# Patient Record
Sex: MALE | Race: WHITE | Employment: OTHER | URBAN - METROPOLITAN AREA
[De-identification: names, ages, dates, MRNs, and addresses within clinical notes are randomized per-mention and may not be internally consistent; named-entity substitution may affect disease eponyms.]

---

## 2017-02-09 ENCOUNTER — HOSPITAL ENCOUNTER (INPATIENT)
Age: 57
LOS: 1 days | Discharge: HOME | DRG: 871 | End: 2017-02-10
Attending: EMERGENCY MEDICINE | Admitting: INTERNAL MEDICINE
Payer: COMMERCIAL

## 2017-02-09 ENCOUNTER — APPOINTMENT (OUTPATIENT)
Dept: GENERAL RADIOLOGY | Age: 57
DRG: 871 | End: 2017-02-09
Attending: EMERGENCY MEDICINE
Payer: COMMERCIAL

## 2017-02-09 DIAGNOSIS — J11.1 FLU: Primary | ICD-10-CM

## 2017-02-09 PROBLEM — J44.1 COPD EXACERBATION (HCC): Status: ACTIVE | Noted: 2017-02-09

## 2017-02-09 PROBLEM — A41.9 SEPSIS (HCC): Status: ACTIVE | Noted: 2017-02-09

## 2017-02-09 PROBLEM — J96.01 ACUTE RESPIRATORY FAILURE WITH HYPOXIA (HCC): Status: ACTIVE | Noted: 2017-02-09

## 2017-02-09 LAB
ALBUMIN SERPL BCP-MCNC: 4.2 G/DL (ref 3.5–5)
ALBUMIN/GLOB SERPL: 0.8 {RATIO} (ref 1.2–3.5)
ALP SERPL-CCNC: 113 U/L (ref 50–136)
ALT SERPL-CCNC: 30 U/L (ref 12–65)
ANION GAP BLD CALC-SCNC: 15 MMOL/L (ref 7–16)
AST SERPL W P-5'-P-CCNC: 30 U/L (ref 15–37)
ATRIAL RATE: 134 BPM
BASOPHILS # BLD AUTO: 0 K/UL (ref 0–0.2)
BASOPHILS # BLD: 0 % (ref 0–2)
BILIRUB SERPL-MCNC: 0.5 MG/DL (ref 0.2–1.1)
BUN SERPL-MCNC: 10 MG/DL (ref 6–23)
CALCIUM SERPL-MCNC: 9.8 MG/DL (ref 8.3–10.4)
CALCULATED P AXIS, ECG09: 83 DEGREES
CALCULATED R AXIS, ECG10: 96 DEGREES
CALCULATED T AXIS, ECG11: 81 DEGREES
CHLORIDE SERPL-SCNC: 93 MMOL/L (ref 98–107)
CO2 SERPL-SCNC: 23 MMOL/L (ref 21–32)
CREAT SERPL-MCNC: 1.31 MG/DL (ref 0.8–1.5)
DIAGNOSIS, 93000: NORMAL
DIASTOLIC BP, ECG02: NORMAL MMHG
DIFFERENTIAL METHOD BLD: ABNORMAL
EOSINOPHIL # BLD: 0 K/UL (ref 0–0.8)
EOSINOPHIL NFR BLD: 0 % (ref 0.5–7.8)
ERYTHROCYTE [DISTWIDTH] IN BLOOD BY AUTOMATED COUNT: 13.5 % (ref 11.9–14.6)
FLUAV AG NPH QL IA: POSITIVE
FLUBV AG NPH QL IA: NEGATIVE
GLOBULIN SER CALC-MCNC: 5.3 G/DL (ref 2.3–3.5)
GLUCOSE SERPL-MCNC: 121 MG/DL (ref 65–100)
HCT VFR BLD AUTO: 53.9 % (ref 41.1–50.3)
HGB BLD-MCNC: 18.7 G/DL (ref 13.6–17.2)
IMM GRANULOCYTES # BLD: 0 K/UL (ref 0–0.5)
IMM GRANULOCYTES NFR BLD AUTO: 0.2 % (ref 0–5)
LACTATE BLD-SCNC: 2.3 MMOL/L (ref 0.5–1.9)
LYMPHOCYTES # BLD AUTO: 9 % (ref 13–44)
LYMPHOCYTES # BLD: 1.2 K/UL (ref 0.5–4.6)
MCH RBC QN AUTO: 36.3 PG (ref 26.1–32.9)
MCHC RBC AUTO-ENTMCNC: 34.7 G/DL (ref 31.4–35)
MCV RBC AUTO: 104.7 FL (ref 79.6–97.8)
MONOCYTES # BLD: 0.8 K/UL (ref 0.1–1.3)
MONOCYTES NFR BLD AUTO: 6 % (ref 4–12)
NEUTS SEG # BLD: 11 K/UL (ref 1.7–8.2)
NEUTS SEG NFR BLD AUTO: 85 % (ref 43–78)
P-R INTERVAL, ECG05: 126 MS
PLATELET # BLD AUTO: 280 K/UL (ref 150–450)
PMV BLD AUTO: 9.3 FL (ref 10.8–14.1)
POTASSIUM SERPL-SCNC: 4.1 MMOL/L (ref 3.5–5.1)
PROCALCITONIN SERPL-MCNC: 0.5 NG/ML
PROT SERPL-MCNC: 9.5 G/DL (ref 6.3–8.2)
Q-T INTERVAL, ECG07: 276 MS
QRS DURATION, ECG06: 74 MS
QTC CALCULATION (BEZET), ECG08: 412 MS
RBC # BLD AUTO: 5.15 M/UL (ref 4.23–5.67)
SODIUM SERPL-SCNC: 131 MMOL/L (ref 136–145)
SYSTOLIC BP, ECG01: NORMAL MMHG
VENTRICULAR RATE, ECG03: 134 BPM
WBC # BLD AUTO: 13 K/UL (ref 4.3–11.1)

## 2017-02-09 PROCEDURE — 74011250636 HC RX REV CODE- 250/636: Performed by: EMERGENCY MEDICINE

## 2017-02-09 PROCEDURE — 74011250636 HC RX REV CODE- 250/636: Performed by: INTERNAL MEDICINE

## 2017-02-09 PROCEDURE — 96361 HYDRATE IV INFUSION ADD-ON: CPT | Performed by: EMERGENCY MEDICINE

## 2017-02-09 PROCEDURE — 83605 ASSAY OF LACTIC ACID: CPT

## 2017-02-09 PROCEDURE — 87040 BLOOD CULTURE FOR BACTERIA: CPT | Performed by: EMERGENCY MEDICINE

## 2017-02-09 PROCEDURE — 74011250637 HC RX REV CODE- 250/637: Performed by: FAMILY MEDICINE

## 2017-02-09 PROCEDURE — 74011000250 HC RX REV CODE- 250: Performed by: INTERNAL MEDICINE

## 2017-02-09 PROCEDURE — 65270000029 HC RM PRIVATE

## 2017-02-09 PROCEDURE — 99285 EMERGENCY DEPT VISIT HI MDM: CPT | Performed by: EMERGENCY MEDICINE

## 2017-02-09 PROCEDURE — 87804 INFLUENZA ASSAY W/OPTIC: CPT | Performed by: EMERGENCY MEDICINE

## 2017-02-09 PROCEDURE — 74011250637 HC RX REV CODE- 250/637: Performed by: INTERNAL MEDICINE

## 2017-02-09 PROCEDURE — 80053 COMPREHEN METABOLIC PANEL: CPT | Performed by: EMERGENCY MEDICINE

## 2017-02-09 PROCEDURE — 94640 AIRWAY INHALATION TREATMENT: CPT

## 2017-02-09 PROCEDURE — 71020 XR CHEST PA LAT: CPT

## 2017-02-09 PROCEDURE — 85025 COMPLETE CBC W/AUTO DIFF WBC: CPT | Performed by: EMERGENCY MEDICINE

## 2017-02-09 PROCEDURE — 93005 ELECTROCARDIOGRAM TRACING: CPT | Performed by: EMERGENCY MEDICINE

## 2017-02-09 PROCEDURE — 96360 HYDRATION IV INFUSION INIT: CPT | Performed by: EMERGENCY MEDICINE

## 2017-02-09 PROCEDURE — 84145 PROCALCITONIN (PCT): CPT | Performed by: EMERGENCY MEDICINE

## 2017-02-09 PROCEDURE — 74011250637 HC RX REV CODE- 250/637: Performed by: EMERGENCY MEDICINE

## 2017-02-09 RX ORDER — BENZONATATE 100 MG/1
200 CAPSULE ORAL
Status: COMPLETED | OUTPATIENT
Start: 2017-02-09 | End: 2017-02-09

## 2017-02-09 RX ORDER — OSELTAMIVIR PHOSPHATE 75 MG/1
75 CAPSULE ORAL ONCE
Status: COMPLETED | OUTPATIENT
Start: 2017-02-09 | End: 2017-02-09

## 2017-02-09 RX ORDER — IPRATROPIUM BROMIDE AND ALBUTEROL SULFATE 2.5; .5 MG/3ML; MG/3ML
3 SOLUTION RESPIRATORY (INHALATION)
Status: DISCONTINUED | OUTPATIENT
Start: 2017-02-09 | End: 2017-02-10 | Stop reason: HOSPADM

## 2017-02-09 RX ORDER — SODIUM CHLORIDE 9 MG/ML
125 INJECTION, SOLUTION INTRAVENOUS CONTINUOUS
Status: DISCONTINUED | OUTPATIENT
Start: 2017-02-09 | End: 2017-02-10 | Stop reason: HOSPADM

## 2017-02-09 RX ORDER — OXYCODONE AND ACETAMINOPHEN 5; 325 MG/1; MG/1
1 TABLET ORAL EVERY 6 HOURS
Status: DISCONTINUED | OUTPATIENT
Start: 2017-02-09 | End: 2017-02-09

## 2017-02-09 RX ORDER — OXYCODONE AND ACETAMINOPHEN 5; 325 MG/1; MG/1
1 TABLET ORAL
Status: DISCONTINUED | OUTPATIENT
Start: 2017-02-09 | End: 2017-02-10 | Stop reason: HOSPADM

## 2017-02-09 RX ORDER — SODIUM CHLORIDE 0.9 % (FLUSH) 0.9 %
5-10 SYRINGE (ML) INJECTION AS NEEDED
Status: DISCONTINUED | OUTPATIENT
Start: 2017-02-09 | End: 2017-02-10 | Stop reason: HOSPADM

## 2017-02-09 RX ORDER — OSELTAMIVIR PHOSPHATE 30 MG/1
30 CAPSULE ORAL 2 TIMES DAILY
Status: DISCONTINUED | OUTPATIENT
Start: 2017-02-09 | End: 2017-02-10 | Stop reason: HOSPADM

## 2017-02-09 RX ORDER — ACETAMINOPHEN 500 MG
1000 TABLET ORAL
Status: COMPLETED | OUTPATIENT
Start: 2017-02-09 | End: 2017-02-09

## 2017-02-09 RX ORDER — IPRATROPIUM BROMIDE AND ALBUTEROL SULFATE 2.5; .5 MG/3ML; MG/3ML
3 SOLUTION RESPIRATORY (INHALATION)
Status: DISCONTINUED | OUTPATIENT
Start: 2017-02-09 | End: 2017-02-09

## 2017-02-09 RX ORDER — ACETAMINOPHEN 325 MG/1
650 TABLET ORAL
Status: DISCONTINUED | OUTPATIENT
Start: 2017-02-09 | End: 2017-02-10 | Stop reason: HOSPADM

## 2017-02-09 RX ADMIN — ACETAMINOPHEN 1000 MG: 500 TABLET, FILM COATED ORAL at 10:25

## 2017-02-09 RX ADMIN — BENZONATATE 200 MG: 100 CAPSULE ORAL at 10:24

## 2017-02-09 RX ADMIN — METHYLPREDNISOLONE SODIUM SUCCINATE 20 MG: 40 INJECTION, POWDER, FOR SOLUTION INTRAMUSCULAR; INTRAVENOUS at 15:27

## 2017-02-09 RX ADMIN — SODIUM CHLORIDE 125 ML/HR: 900 INJECTION, SOLUTION INTRAVENOUS at 20:37

## 2017-02-09 RX ADMIN — SODIUM CHLORIDE 125 ML/HR: 900 INJECTION, SOLUTION INTRAVENOUS at 15:27

## 2017-02-09 RX ADMIN — OSELTAMIVIR PHOSPHATE 30 MG: 30 CAPSULE ORAL at 20:37

## 2017-02-09 RX ADMIN — IPRATROPIUM BROMIDE AND ALBUTEROL SULFATE 3 ML: 2.5; .5 SOLUTION RESPIRATORY (INHALATION) at 21:17

## 2017-02-09 RX ADMIN — OSELTAMIVIR PHOSPHATE 75 MG: 75 CAPSULE ORAL at 10:24

## 2017-02-09 RX ADMIN — SODIUM CHLORIDE 1905 ML: 900 INJECTION, SOLUTION INTRAVENOUS at 10:22

## 2017-02-09 RX ADMIN — ACETAMINOPHEN 650 MG: 325 TABLET, FILM COATED ORAL at 22:23

## 2017-02-09 NOTE — ED PROVIDER NOTES
HPI Comments: 80-year-old gentleman with a history of a fever, chills, cough, shortness of breath, and not feeling well for approximately 2 days. Patient went to his primary care doctor's office who then sent him to the emergency department due to his tachycardia and his respiratory difficulty. Patient says he smokes at least a pack of cigarettes a day. He says about 2 days ago he started to not feel well in the last 36 hours ago at significantly worse. He says he has been coughing extensively but has not coughed anything up. Elements of this note were created with speech recognition software. As such, there may be errors of speech recognition present. Patient is a 64 y.o. male presenting with shortness of breath. The history is provided by the patient. Shortness of Breath   Associated symptoms include a fever and cough. Pertinent negatives include no headaches, no rhinorrhea, no sore throat, no wheezing, no chest pain, no vomiting, no abdominal pain and no rash. Past Medical History:   Diagnosis Date    COPD (chronic obstructive pulmonary disease) ([facility] [address]     Hepatitis C     Hyperlipidemia     Hypertension     Pulmonary nodule     Tobacco abuse 6/24/2015       History reviewed. No pertinent past surgical history.       Family History:   Problem Relation Age of Onset    COPD Mother      Stage 3    Cancer Father      Prostate    Diabetes Father     Heart Disease Father     Cancer Sister      Breast    Other Sister      Atrial Fibrillation    Other Brother      Coronary artery bypass surgery    Alzheimer Maternal Grandmother     Dementia Maternal Grandmother     Other Maternal Grandfather      Old age   Agustin Padron Allergy-severe Paternal Grandmother     Cancer Paternal Grandfather      Prostate    Diabetes Paternal Grandfather        Social History     Social History    Marital status: LEGALLY      Spouse name: N/A    Number of children: N/A    Years of education: N/A Occupational History    Not on file. Social History Main Topics    Smoking status: Current Every Day Smoker     Packs/day: 0.25     Years: 35.00     Types: Cigarettes     Start date: 4/9/1983    Smokeless tobacco: Not on file    Alcohol use 21.0 - 42.0 oz/week     42 - 84 Standard drinks or equivalent per week    Drug use: No    Sexual activity: Not on file     Other Topics Concern    Not on file     Social History Narrative         ALLERGIES: Bees [hymenoptera allergenic extract]    Review of Systems   Constitutional: Positive for chills and fever. Negative for diaphoresis. HENT: Negative for congestion, rhinorrhea and sore throat. Eyes: Negative for redness and visual disturbance. Respiratory: Positive for cough and shortness of breath. Negative for chest tightness and wheezing. Cardiovascular: Negative for chest pain and palpitations. Gastrointestinal: Negative for abdominal pain, blood in stool, diarrhea, nausea and vomiting. Endocrine: Negative for polydipsia and polyuria. Genitourinary: Negative for dysuria and hematuria. Musculoskeletal: Negative for arthralgias, myalgias and neck stiffness. Skin: Negative for rash. Allergic/Immunologic: Negative for environmental allergies and food allergies. Neurological: Negative for dizziness, weakness and headaches. Hematological: Negative for adenopathy. Does not bruise/bleed easily. Psychiatric/Behavioral: Negative for confusion and sleep disturbance. The patient is not nervous/anxious. Vitals:    02/09/17 0944   BP: (!) 157/109   Pulse: (!) 140   Resp: 22   Temp: (!) 101 °F (38.3 °C)   SpO2: (!) 88%   Weight: 63.5 kg (140 lb)   Height: 6' 1\" (1.854 m)            Physical Exam   Constitutional: He is oriented to person, place, and time. He appears well-developed. Patient looks significantly older than his stated age   HENT:   Head: Normocephalic and atraumatic.    Eyes: Conjunctivae and EOM are normal. Pupils are equal, round, and reactive to light. Neck: Normal range of motion. Cardiovascular: Regular rhythm. tachycardia   Pulmonary/Chest: Breath sounds normal. He is in respiratory distress. He has no wheezes. He has no rales. He exhibits no tenderness. Moderate respiratory difficulty with tachypnea and diffuse bilateral wheezes   Abdominal: Soft. Bowel sounds are normal. There is no rebound and no guarding. Musculoskeletal: Normal range of motion. He exhibits no edema or tenderness. Lymphadenopathy:     He has no cervical adenopathy. Neurological: He is alert and oriented to person, place, and time. Skin: Skin is warm and dry. Psychiatric: He has a normal mood and affect. Nursing note and vitals reviewed. MDM  Number of Diagnoses or Management Options  Diagnosis management comments: Patient's flu test is positive tenderness suspect that is the source of his fever, tachycardia, and hypoxia. Even that he meets sepsis criteria do not think that he needs antibiotics at this time as his x-ray showed no evidence of pneumonia. Given his hypoxia and his other symptoms I will plan to admit him to the hospital for further care. I will go ahead and start Tamiflu.     ED Course       Procedures

## 2017-02-09 NOTE — H&P
HOSPITALIST HISTORY AND PHYSICAL  NAME:  Vickie Quinonez   Age:  64 y.o.  :   1960   MRN:   [id number]  PCP: Juan Brewer MD  Consulting MD:  Treatment Team: Attending Provider: Bree Ruiz MD; Primary Nurse: Ramsey Blevins RN    REASON FOR ADMISSION: sepsis secondary to influenza a. Copd exacerbation, hypoxic respiratory failure    HPI:   64yr old pleasant male with a PMHX sig for HTN and mild copd. States that he developed an sinus infection that he has been fighting for about 2 weeks. Started to feel worse on Monday, weak, generalized aches and pains- cough - sob. Went to PCP but still felt worse so came here. In the ER found to be septic secondary to influenza A, Copd exacerbation- hypoxic resp failure- hospitalist asked to admit. Complete ROS done and is as stated in HPI or otherwise negative  Past Medical History   Diagnosis Date    [facility]chronic obstructive pulmonary disease) ([facility] [address]     Hepatitis C     Hyperlipidemia     Hypertension     Pulmonary nodule     Tobacco abuse 2015      History reviewed. No pertinent past surgical history. Prior to Admission Medications   Prescriptions Last Dose Informant Patient Reported? Taking? Hydrochlorothiazide (HYDRODIURIL) 12.5 mg tablet   No No   Sig: Take 1 Tab by mouth daily. albuterol (PROVENTIL HFA, VENTOLIN HFA, PROAIR HFA) 90 mcg/actuation inhaler   No No   Sig: Take 1 Puff by inhalation every six (6) hours as needed for Wheezing. gabapentin (NEURONTIN) 300 mg capsule   No No   Sig: Take 1 Cap by mouth nightly. Take 2 hrs before bedtime. tiotropium bromide (SPIRIVA RESPIMAT) 2.5 mcg/actuation inhaler   No No   Sig: Take 2 Puffs by inhalation daily.       Facility-Administered Medications: None     Allergies   Allergen Reactions    Bees [Hymenoptera Allergenic Extract] Swelling      Social History   Substance Use Topics    Smoking status: Current Every Day Smoker     Packs/day: 0.25     Years: 35.00     Types: Cigarettes Start date: 1983    Smokeless tobacco: Not on file    Alcohol use 21.0 - 42.0 oz/week     42 - 84 Standard drinks or equivalent per week      Family History   Problem Relation Age of Onset    COPD Mother      Stage 3    Cancer Father      Prostate    Diabetes Father     Heart Disease Father     Cancer Sister      Breast    Other Sister      Atrial Fibrillation    Other Brother      Coronary artery bypass surgery    Alzheimer Maternal Grandmother     Dementia Maternal Grandmother     Other Maternal Grandfather      Old age   Vertie Amis Allergy-severe Paternal Grandmother     Cancer Paternal Grandfather      Prostate    Diabetes Paternal Grandfather       Objective:     Visit Vitals    /79    Pulse (!) 106    Temp (!) 101 °F (38.3 °C)    Resp 22    Ht 6' 1\" (1.854 m)    Wt 63.5 kg (140 lb)    SpO2 95%    BMI 18.47 kg/m2      Temp (24hrs), Av.2 °F (38.4 °C), Min:101 °F (38.3 °C), Max:101.4 °F (38.6 °C)    Oxygen Therapy  O2 Sat (%): 95 % (17 1207)  Pulse via Oximetry: 105 beats per minute (17 1207)  O2 Device: Nasal cannula (1745)  O2 Flow Rate (L/min): 2 l/min (17 0945)  Physical Exam:  General:    Alert, cooperative, no distress, appears stated age. Head:   Normocephalic, without obvious abnormality, atraumatic. Nose:  Nares normal. No drainage or sinus tenderness. Lungs:   Clear to auscultation bilaterally. No Wheezing or Rhonchi. No rales. Heart:   Regular rate and rhythm,  no murmur, rub or gallop. Abdomen:   Soft, non-tender. Not distended. Bowel sounds normal.   Extremities: No cyanosis. No edema. No clubbing  Skin:     Texture, turgor normal. No rashes or lesions.   Not Jaundiced  Neurologic: Alert and oriented x 3, no focal deficits   Data Review: personally by me   Recent Results (from the past 24 hour(s))   METABOLIC PANEL, COMPREHENSIVE    Collection Time: 17  9:47 AM   Result Value Ref Range    Sodium 131 (L) 136 - 145 mmol/L Potassium 4.1 3.5 - 5.1 mmol/L    Chloride 93 (L) 98 - 107 mmol/L    CO2 23 21 - 32 mmol/L    Anion gap 15 7 - 16 mmol/L    Glucose 121 (H) 65 - 100 mg/dL    BUN 10 6 - 23 MG/DL    Creatinine 1.31 0.8 - 1.5 MG/DL    GFR est AA >60 >60 ml/min/1.73m2    GFR est non-AA >60 >60 ml/min/1.73m2    Calcium 9.8 8.3 - 10.4 MG/DL    Bilirubin, total 0.5 0.2 - 1.1 MG/DL    ALT (SGPT) 30 12 - 65 U/L    AST (SGOT) 30 15 - 37 U/L    Alk. phosphatase 113 50 - 136 U/L    Protein, total 9.5 (H) 6.3 - 8.2 g/dL    Albumin 4.2 3.5 - 5.0 g/dL    Globulin 5.3 (H) 2.3 - 3.5 g/dL    A-G Ratio 0.8 (L) 1.2 - 3.5     CBC WITH AUTOMATED DIFF    Collection Time: 02/09/17  9:47 AM   Result Value Ref Range    WBC 13.0 (H) 4.3 - 11.1 K/uL    RBC 5.15 4.23 - 5.67 M/uL    HGB 18.7 (H) 13.6 - 17.2 g/dL    HCT 53.9 (H) 41.1 - 50.3 %    .7 (H) 79.6 - 97.8 FL    MCH 36.3 (H) 26.1 - 32.9 PG    MCHC 34.7 31.4 - 35.0 g/dL    RDW 13.5 11.9 - 14.6 %    PLATELET 261 013 - 698 K/uL    MPV 9.3 (L) 10.8 - 14.1 FL    DF AUTOMATED      NEUTROPHILS 85 (H) 43 - 78 %    LYMPHOCYTES 9 (L) 13 - 44 %    MONOCYTES 6 4.0 - 12.0 %    EOSINOPHILS 0 (L) 0.5 - 7.8 %    BASOPHILS 0 0.0 - 2.0 %    IMMATURE GRANULOCYTES 0.2 0.0 - 5.0 %    ABS. NEUTROPHILS 11.0 (H) 1.7 - 8.2 K/UL    ABS. LYMPHOCYTES 1.2 0.5 - 4.6 K/UL    ABS. MONOCYTES 0.8 0.1 - 1.3 K/UL    ABS. EOSINOPHILS 0.0 0.0 - 0.8 K/UL    ABS. BASOPHILS 0.0 0.0 - 0.2 K/UL    ABS. IMM.  GRANS. 0.0 0.0 - 0.5 K/UL   INFLUENZA A & B AG (RAPID TEST)    Collection Time: 02/09/17  9:55 AM   Result Value Ref Range    Influenza A Ag POSITIVE (A) NEG      Influenza B Ag NEGATIVE  NEG     POC LACTIC ACID    Collection Time: 02/09/17 10:09 AM   Result Value Ref Range    Lactic Acid (POC) 2.3 (H) 0.5 - 1.9 mmol/L   EKG, 12 LEAD, INITIAL    Collection Time: 02/09/17 10:15 AM   Result Value Ref Range    Systolic BP  mmHg    Diastolic BP  mmHg    Ventricular Rate 134 BPM    Atrial Rate 134 BPM    P-R Interval 126 ms    QRS Duration 74 ms    Q-T Interval 276 ms    QTC Calculation (Bezet) 412 ms    Calculated P Axis 83 degrees    Calculated R Axis 96 degrees    Calculated T Axis 81 degrees    Diagnosis       !! AGE AND GENDER SPECIFIC ECG ANALYSIS !! Sinus tachycardia  Biatrial enlargement  Rightward axis  Pulmonary disease pattern  Abnormal ECG  No previous ECGs available       Imaging /Procedures /Studies reviewed personally by me  XR Results (most recent):    Results from Hospital Encounter encounter on 02/09/17   XR CHEST PA LAT   Narrative CHEST X-RAY PA AND LATERAL : 2/9/2017  Indication: Fever, weakness for 4 days      Comparison: 3/16/2015  Findings: The lung fields are remarkable for a stable, subtle ill-defined  infiltrate in the left upper lobe. The heart, mediastinum, soft tissues, and  bony structures are remarkable for scoliosis. Impression IMPRESSION: Thoracic scoliosis, stable scarlike infiltrate within the left upper  lobe. CT Scan    Results from Hospital Encounter encounter on 12/03/15   CT CHEST WO CONT   Narrative CT of the chest without contrast.    CLINICAL INDICATION: Pulmonary nodules, followup examination. PROCEDURE: Serial thin section axial images are obtained from the thoracic inlet  through the upper abdomen without the administration of intravenous contrast.    COMPARISON: Chest CT dated 4/3/2015, PET/CT dated 4/14/2015    FINDINGS: The irregular, spiculated nodular density in the posterior aspect of  the left upper lobe that measures roughly 1.8 cm remains stable. Only low  metabolic uptake was noted on the prior chest CT. No new suspicious pulmonary  nodules evident. There is a stable 2 mm pulmonary nodule in the subpleural left  lower lobe. No confluent, dense airspace opacity noted. There is no pleural  effusion or pneumothorax. No mediastinal or axillary adenopathy. Limited evaluation of the upper admissions the adrenal glands to be normal.    No aggressive osseous lesions identified. Impression IMPRESSION:  Stable 1.8 cm spiculated nodular density in the posterior left upper lobe. Given  the low metabolic uptake on PET CT a low-grade neoplasm is not entirely  excluded. Continued CT imaging surveillance as the nodule remains stable. Recommend repeating the CT chest in six months. VAS/US Results (most recent):    Results from Hospital Encounter encounter on 06/29/15   DUPLEX LOW EXT ARTERIES WITH LILI   Narrative HISTORY: Possible claudication. Right left brachial systolic blood pressures were 122 mm mercury and 123 mm  mercury respectively. The right posterior tibialis and dorsalis pedis pressures  were 143 and 126 mm mercury right ankle-brachial index of 1.2. The left  posterior tibialis and dorsalis pedis pressures were 139 and 133 mm mercury  respectively yielding ankle brachial index of 1.1. Abdominal aorta measures 1.6 cm and demonstrates no significant plaque. Calcified posterior atherosclerotic plaque is seen in the posterior common  femoral arteries bilaterally. Waveform tracings on the right are triphasic in  the distal popliteal artery and biphasic in the posterior and anterior tibialis  arteries. On the left, triphasic waveform tracings are demonstrated into the  popliteal artery and biphasic waveforms are present in the anterior posterior  tibialis arteries. Impression IMPRESSION: Normal ankle brachial indices bilaterally, at rest          Assessment and Plan:      Active Hospital Problems    Diagnosis Date Noted    COPD exacerbation ([facility] [address] 02/09/2017    Sepsis ([facility] [address] 02/09/2017    Influenza 02/09/2017    Acute respiratory failure with hypoxia (H[facility]) 02/09/2017       PLAN  ·  Sepsis secondary to influenza- pt, tachycardic with HR >100- 140 bpm, temp of 101.4, wbc >12, lactic acid elevated, hypoxic admit - give supportive care- monitor for resolution- follow up cultures  · Influenza A- start Tamiflu  · Hypoxic resp failure- sats 88% on RA now on O2 2L via NC in ER will try to wean off as we treat underlying conditions  · Copd exacerbation- duonebs steroids, abx-  · HTN- continue home meds  ·  further mgt as his clinical course dictates    Code Status:   Full   Anticipated discharge:   2-3 days  Signed By: Constance Jaramillo MD     February 9, 2017

## 2017-02-09 NOTE — ROUTINE PROCESS
TRANSFER - OUT REPORT:    Verbal report given to receiving RN on Cecilio Lopez  being transferred to [phone] for routine progression of care       Report consisted of patients Situation, Background, Assessment and   Recommendations(SBAR). Information from the following report(s) ED Summary, MAR, Recent Results and Cardiac Rhythm SinusTach was reviewed with the receiving nurse. Lines:   Peripheral IV 02/09/17 Right Antecubital (Active)   Site Assessment Clean, dry, & intact 2/9/2017 10:19 AM   Phlebitis Assessment 0 2/9/2017 10:19 AM   Infiltration Assessment 0 2/9/2017 10:19 AM   Dressing Status Clean, dry, & intact 2/9/2017 10:19 AM   Hub Color/Line Status Green 2/9/2017 10:19 AM        Opportunity for questions and clarification was provided.       Patient transported with:   O2 @ 2 liters

## 2017-02-09 NOTE — PROGRESS NOTES
Pt is alert and oriented X4. Has no nausea or vomiting at this time. No reports of pain, just an irritation in throat due to cough. HR is tachycardic at this time but regular. Abdomen is soft and non-tender, BS active in 4 Quadrants. Pt is on 2L O2 via N/C. Up to bathroom to void. Instructed pt to call for assistance, due to possible weakness.

## 2017-02-09 NOTE — IP AVS SNAPSHOT
[address] 
[phone] Patient: Sabas Garcia MRN: [id number]:[id number] You are allergic to the following Allergen Reactions Bees (Hymenoptera Allergenic Extract) Swelling Recent Documentation Height Weight BMI Smoking Status 1.854 m 63.5 kg 18.47 kg/m2 Current Every Day Smoker Unresulted Labs Order Current Status CULTURE, BLOOD Preliminary result CULTURE, BLOOD Preliminary result Emergency Contacts Name Discharge Info Relation Home Work Mobile [facility] DISCHARGE CAREGIVER [3] Sister [23]   [phone] Jas Watters     [phone] About your hospitalization You were admitted on:  February 9, 2017 You last received care in the:  [address] You were discharged on:  February 10, 2017 Unit phone number:  [phone] Why you were hospitalized Your primary diagnosis was:  Sepsis (Hcc) Your diagnoses also included:  Copd Exacerbation (Hcc), Influenza, Acute Respiratory Failure With Hypoxia (Hcc) Providers Seen During Your Hospitalizations Provider Role Specialty Primary office phone Jerod Lakhani MD Attending Provider Emergency Medicine [phone] Wero Kohler MD Attending Provider Internal Medicine [phone] Your Primary Care Physician (PCP) Primary Care Physician Office Phone Office Fax Matt Fernández [phone] Follow-up Information Follow up With Details Comments Contact Info Chelsea Cole MD In 1 week OFFICE CLOSED PLEASE CALL ON MONDAY TO SCHEDULE FOLLOW UP APPT. [address] [place] [id number] [phone] Your Appointments Monday February 13, 2017  8:15 AM EST Follow Up with Chelsea Cole MD  
[facility] ([facility]) [address] [place] [address] [phone] Current Discharge Medication List  
  
START taking these medications Dose & Instructions Dispensing Information Comments Morning Noon Evening Bedtime  
 oseltamivir 30 mg capsule Commonly known as:  TAMIFLU Your next dose is: Today, Tomorrow Other:  _________ Dose:  30 mg Take 1 Cap by mouth two (2) times a day for 3 days. Quantity:  6 Cap Refills:  0  
     
   
   
   
  
 predniSONE 10 mg tablet Commonly known as:  Hollace Margarito Your next dose is: Today, Tomorrow Other:  _________ Dose:  10 mg Take 1 Tab by mouth daily (with breakfast) for 5 days. Quantity:  5 Tab Refills:  0 CONTINUE these medications which have NOT CHANGED Dose & Instructions Dispensing Information Comments Morning Noon Evening Bedtime  
 albuterol 90 mcg/actuation inhaler Commonly known as:  PROVENTIL HFA, VENTOLIN HFA, PROAIR HFA Your next dose is: Today, Tomorrow Other:  _________ Dose:  1 Puff Take 1 Puff by inhalation every six (6) hours as needed for Wheezing. Quantity:  1 Inhaler Refills:  3  
     
   
   
   
  
 gabapentin 300 mg capsule Commonly known as:  NEURONTIN Your next dose is: Today, Tomorrow Other:  _________ Dose:  300 mg Take 1 Cap by mouth nightly. Take 2 hrs before bedtime. Quantity:  30 Cap Refills:  5  
     
   
   
   
  
 hydroCHLOROthiazide 12.5 mg tablet Commonly known as:  HYDRODIURIL Your next dose is: Today, Tomorrow Other:  _________ Dose:  12.5 mg Take 1 Tab by mouth daily. Quantity:  30 Tab Refills:  3  
     
   
   
   
  
 tiotropium bromide 2.5 mcg/actuation inhaler Commonly known as:  Paris Schneiders Your next dose is: Today, Tomorrow Other:  _________ Dose:  2 Puff Take 2 Puffs by inhalation daily. Quantity:  1 Inhaler Refills:  0 Where to Get Your Medications Information on where to get these meds will be given to you by the nurse or doctor. ! Ask your nurse or doctor about these medications  
  oseltamivir 30 mg capsule  
 predniSONE 10 mg tablet Discharge Instructions DISCHARGE SUMMARY from Nurse The following personal items are in your possession at time of discharge: 
 
  
Visual Aid: None PATIENT INSTRUCTIONS: 
 
After general anesthesia or intravenous sedation, for 24 hours or while taking prescription Narcotics: · Limit your activities · Do not drive and operate hazardous machinery · Do not make important personal or business decisions · Do  not drink alcoholic beverages · If you have not urinated within 8 hours after discharge, please contact your surgeon on call. Report the following to your surgeon: 
· Excessive pain, swelling, redness or odor of or around the surgical area · Temperature over 100.5 · Nausea and vomiting lasting longer than 4 hours or if unable to take medications · Any signs of decreased circulation or nerve impairment to extremity: change in color, persistent  numbness, tingling, coldness or increase pain · Any questions What to do at Home: 
Recommended activity: Activity as tolerated, per MD 
 
If you experience any of the following symptoms fever>101, pain unrelieved with medication, nausea/vomiting, shortness of breath unrelieved by rest, dizziness/fainting, chest pain. , please follow up with your doctor. *  Please give a list of your current medications to your Primary Care Provider. *  Please update this list whenever your medications are discontinued, doses are 
    changed, or new medications (including over-the-counter products) are added. *  Please carry medication information at all times in case of emergency situations. These are general instructions for a healthy lifestyle: No smoking/ No tobacco products/ Avoid exposure to second hand smoke Surgeon General's Warning:  Quitting smoking now greatly reduces serious risk to your health. Obesity, smoking, and sedentary lifestyle greatly increases your risk for illness A healthy diet, regular physical exercise & weight monitoring are important for maintaining a healthy lifestyle You may be retaining fluid if you have a history of heart failure or if you experience any of the following symptoms:  Weight gain of 3 pounds or more overnight or 5 pounds in a week, increased swelling in our hands or feet or shortness of breath while lying flat in bed. Please call your doctor as soon as you notice any of these symptoms; do not wait until your next office visit. Recognize signs and symptoms of STROKE: 
 
F-face looks uneven A-arms unable to move or move unevenly S-speech slurred or non-existent T-time-call 911 as soon as signs and symptoms begin-DO NOT go Back to bed or wait to see if you get better-TIME IS BRAIN. Warning Signs of HEART ATTACK Call 911 if you have these symptoms: 
? Chest discomfort. Most heart attacks involve discomfort in the center of the chest that lasts more than a few minutes, or that goes away and comes back. It can feel like uncomfortable pressure, squeezing, fullness, or pain. ? Discomfort in other areas of the upper body. Symptoms can include pain or discomfort in one or both arms, the back, neck, jaw, or stomach. ? Shortness of breath with or without chest discomfort. ? Other signs may include breaking out in a cold sweat, nausea, or lightheadedness. Don't wait more than five minutes to call [address]! Fast action can save your life. Calling 911 is almost always the fastest way to get lifesaving treatment.  Emergency Medical Services staff can begin treatment when they arrive  up to an hour sooner than if someone gets to the hospital by car. The discharge information has been reviewed with the patient. The patient verbalized understanding. Discharge medications reviewed with the patient and appropriate educational materials and side effects teaching were provided. Learning About COPD, Asthma, and Air Pollution How does air pollution affect COPD and asthma? When you have COPD or asthma, air pollution may make your symptoms worse. If it does, it means that air pollution is a trigger for you. It is important to know what your triggers are and how to deal with them. If air pollution is a trigger for you, you need to learn about air quality and pay attention to weather forecasts that include how bad the air is expected to be. How can you manage a flare-up caused by air pollution? · Do not panic. Quick treatment at home may help you prevent serious breathing problems. · Take your medicines exactly as your doctor tells you. ¨ Use your quick-relief inhaler as directed by your doctor. If your symptoms do not get better after you use your medicine, have someone take you to the emergency room. Call an ambulance if necessary. ¨ With inhaled medicines, a spacer or a nebulizer may help you get more medicine to your lungs. Ask your doctor or pharmacist how to use them properly. Practice using the spacer in front of a mirror before you have a flare-up. This may help you get the medicine into your lungs quickly. ¨ If your doctor has given you steroid pills, take them as directed. ¨ Talk to your doctor if you have any problems with your medicine. What can you do to prevent flare-ups? · Try not to be outside when air pollution levels are high. Stay at home with your windows closed. · Do not smoke. This is the most important step you can take to prevent more damage to your lungs and prevent problems.  If you already smoke, it is never too late to stop. If you need help quitting, talk to your doctor about stop-smoking programs and medicines. These can increase your chances of quitting for good. · Avoid secondhand smoke; cold, dry air; and high altitudes. · Take your daily medicines as prescribed. · Avoid colds and flu. ¨ Get a pneumococcal vaccine. ¨ Get a flu vaccine each year, as soon as it is available. Ask those you live or work with to do the same, so they will not get the flu and infect you. ¨ Try to stay away from people with colds or the flu. ¨ Wash your hands often. Follow-up care is a key part of your treatment and safety. Be sure to make and go to all appointments, and call your doctor if you are having problems. It's also a good idea to know your test results and keep a list of the medicines you take. Where can you learn more? Go to http://mic-mani.info/. Enter  in the search box to learn more about \"Learning About COPD, Asthma, and Air Pollution. \" Current as of: May 23, 2016 Content Version: 11.1 © 1061-3472 [employer]. Care instructions adapted under license by [employer] (which disclaims liability or warranty for this information). If you have questions about a medical condition or this instruction, always ask your healthcare professional. [address] any warranty or liability for your use of this information. Discharge Orders None Introducing [facility]! Dear Ramon Melchor: Thank you for requesting a [facility] account. Our records indicate that you already have an active [facility] account. You can access your account anytime at [email or web address] Did you know that you can access your hospital and ER discharge instructions at any time in [facility]? You can also review all of your test results from your hospital stay or ER visit. Additional Information If you have questions, please visit the Frequently Asked Questions section of the MyChart website at [email or web address] Remember, MyChart is NOT to be used for urgent needs. For medical emergencies, dial 911. Now available from your iPhone and Android! General Information Please provide this summary of care documentation to your next provider. Patient Signature:  ____________________________________________________________ Date:  ____________________________________________________________  
  
[facility] Provider Signature:  ____________________________________________________________ Date:  ____________________________________________________________

## 2017-02-10 VITALS
SYSTOLIC BLOOD PRESSURE: 140 MMHG | OXYGEN SATURATION: 95 % | WEIGHT: 140 LBS | HEART RATE: 89 BPM | TEMPERATURE: 97.8 F | BODY MASS INDEX: 18.55 KG/M2 | HEIGHT: 73 IN | DIASTOLIC BLOOD PRESSURE: 82 MMHG | RESPIRATION RATE: 16 BRPM

## 2017-02-10 LAB
ALBUMIN SERPL BCP-MCNC: 2.6 G/DL (ref 3.5–5)
ALBUMIN/GLOB SERPL: 0.8 {RATIO} (ref 1.2–3.5)
ALP SERPL-CCNC: 64 U/L (ref 50–136)
ALT SERPL-CCNC: 23 U/L (ref 12–65)
ANION GAP BLD CALC-SCNC: 7 MMOL/L (ref 7–16)
AST SERPL W P-5'-P-CCNC: 25 U/L (ref 15–37)
BASOPHILS # BLD AUTO: 0 K/UL (ref 0–0.2)
BASOPHILS # BLD: 0 % (ref 0–2)
BILIRUB SERPL-MCNC: 0.2 MG/DL (ref 0.2–1.1)
BUN SERPL-MCNC: 7 MG/DL (ref 6–23)
CALCIUM SERPL-MCNC: 7.6 MG/DL (ref 8.3–10.4)
CHLORIDE SERPL-SCNC: 102 MMOL/L (ref 98–107)
CO2 SERPL-SCNC: 24 MMOL/L (ref 21–32)
CREAT SERPL-MCNC: 0.83 MG/DL (ref 0.8–1.5)
DIFFERENTIAL METHOD BLD: ABNORMAL
EOSINOPHIL # BLD: 0 K/UL (ref 0–0.8)
EOSINOPHIL NFR BLD: 0 % (ref 0.5–7.8)
ERYTHROCYTE [DISTWIDTH] IN BLOOD BY AUTOMATED COUNT: 13.8 % (ref 11.9–14.6)
GLOBULIN SER CALC-MCNC: 3.4 G/DL (ref 2.3–3.5)
GLUCOSE SERPL-MCNC: 121 MG/DL (ref 65–100)
HCT VFR BLD AUTO: 40.7 % (ref 41.1–50.3)
HGB BLD-MCNC: 13.9 G/DL (ref 13.6–17.2)
IMM GRANULOCYTES # BLD: 0 K/UL (ref 0–0.5)
IMM GRANULOCYTES NFR BLD AUTO: 0.5 % (ref 0–5)
LYMPHOCYTES # BLD AUTO: 20 % (ref 13–44)
LYMPHOCYTES # BLD: 1.2 K/UL (ref 0.5–4.6)
MCH RBC QN AUTO: 36.3 PG (ref 26.1–32.9)
MCHC RBC AUTO-ENTMCNC: 34.2 G/DL (ref 31.4–35)
MCV RBC AUTO: 106.3 FL (ref 79.6–97.8)
MONOCYTES # BLD: 0.5 K/UL (ref 0.1–1.3)
MONOCYTES NFR BLD AUTO: 8 % (ref 4–12)
NEUTS SEG # BLD: 4.4 K/UL (ref 1.7–8.2)
NEUTS SEG NFR BLD AUTO: 72 % (ref 43–78)
PLATELET # BLD AUTO: 206 K/UL (ref 150–450)
PMV BLD AUTO: 9.4 FL (ref 10.8–14.1)
POTASSIUM SERPL-SCNC: 3.5 MMOL/L (ref 3.5–5.1)
PROT SERPL-MCNC: 6 G/DL (ref 6.3–8.2)
RBC # BLD AUTO: 3.83 M/UL (ref 4.23–5.67)
SODIUM SERPL-SCNC: 133 MMOL/L (ref 136–145)
WBC # BLD AUTO: 6.1 K/UL (ref 4.3–11.1)

## 2017-02-10 PROCEDURE — 77010033678 HC OXYGEN DAILY

## 2017-02-10 PROCEDURE — 94760 N-INVAS EAR/PLS OXIMETRY 1: CPT

## 2017-02-10 PROCEDURE — 85025 COMPLETE CBC W/AUTO DIFF WBC: CPT | Performed by: INTERNAL MEDICINE

## 2017-02-10 PROCEDURE — 80053 COMPREHEN METABOLIC PANEL: CPT | Performed by: INTERNAL MEDICINE

## 2017-02-10 PROCEDURE — 74011250636 HC RX REV CODE- 250/636: Performed by: INTERNAL MEDICINE

## 2017-02-10 PROCEDURE — 74011000250 HC RX REV CODE- 250: Performed by: INTERNAL MEDICINE

## 2017-02-10 PROCEDURE — 94640 AIRWAY INHALATION TREATMENT: CPT

## 2017-02-10 PROCEDURE — 36415 COLL VENOUS BLD VENIPUNCTURE: CPT | Performed by: INTERNAL MEDICINE

## 2017-02-10 PROCEDURE — 74011250637 HC RX REV CODE- 250/637: Performed by: INTERNAL MEDICINE

## 2017-02-10 RX ORDER — OSELTAMIVIR PHOSPHATE 30 MG/1
30 CAPSULE ORAL 2 TIMES DAILY
Qty: 6 CAP | Refills: 0 | Status: SHIPPED | OUTPATIENT
Start: 2017-02-10 | End: 2017-02-13

## 2017-02-10 RX ORDER — PREDNISONE 10 MG/1
10 TABLET ORAL
Qty: 5 TAB | Refills: 0 | Status: SHIPPED | OUTPATIENT
Start: 2017-02-10 | End: 2017-02-15

## 2017-02-10 RX ADMIN — OSELTAMIVIR PHOSPHATE 30 MG: 30 CAPSULE ORAL at 09:11

## 2017-02-10 RX ADMIN — IPRATROPIUM BROMIDE AND ALBUTEROL SULFATE 3 ML: 2.5; .5 SOLUTION RESPIRATORY (INHALATION) at 04:27

## 2017-02-10 RX ADMIN — SODIUM CHLORIDE 125 ML/HR: 900 INJECTION, SOLUTION INTRAVENOUS at 03:22

## 2017-02-10 RX ADMIN — IPRATROPIUM BROMIDE AND ALBUTEROL SULFATE 3 ML: 2.5; .5 SOLUTION RESPIRATORY (INHALATION) at 12:55

## 2017-02-10 RX ADMIN — IPRATROPIUM BROMIDE AND ALBUTEROL SULFATE 3 ML: 2.5; .5 SOLUTION RESPIRATORY (INHALATION) at 08:00

## 2017-02-10 RX ADMIN — METHYLPREDNISOLONE SODIUM SUCCINATE 20 MG: 40 INJECTION, POWDER, FOR SOLUTION INTRAMUSCULAR; INTRAVENOUS at 09:11

## 2017-02-10 NOTE — PROGRESS NOTES
Shift assessment complete via doc flowsheet. Pt A&O x 4. HR regular, S1S2. Respirations even and unlabored. Lung sounds coarse bilaterally. Abdomen soft and non tender. Bowel sounds active in all quadrants. Pt denies pain. Oxygen on 2 L via nasal cannula. Peripheral IV patent with IVF infusing. Pt resting in bed. Family present. Bed in low and locked position. Side rails up x 3. Call light within reach. Pt instructed to call for assistance. Pt verbalizes understanding. No other needs expressed. No distress noted. Will continue to monitor.

## 2017-02-10 NOTE — PROGRESS NOTES
Pt C/O pain 6/10 in head. Tylenol 650 mg PO administered, see MAR. No other needs expressed. No distress noted. Will continue to monitor.

## 2017-02-10 NOTE — PROGRESS NOTES
Pt resting in bed. Family present. No distress noted. Pt denies pain. No other needs expressed. Will continue to monitor.

## 2017-02-10 NOTE — PROGRESS NOTES
Pt is feeling much stronger today. No reports of nausea or vomiting. No fever present or reports of pain. Appetite is good, abdomen soft and BS active in 4 Quadrants, HR regular, lungs are somewhat coarse, breathing unlaboured at this time. Pt is voiding regularly. Family present in room.

## 2017-02-10 NOTE — DISCHARGE INSTRUCTIONS
DISCHARGE SUMMARY from Nurse    The following personal items are in your possession at time of discharge:       Visual Aid: None                            PATIENT INSTRUCTIONS:    After general anesthesia or intravenous sedation, for 24 hours or while taking prescription Narcotics:  · Limit your activities  · Do not drive and operate hazardous machinery  · Do not make important personal or business decisions  · Do  not drink alcoholic beverages  · If you have not urinated within 8 hours after discharge, please contact your surgeon on call. Report the following to your surgeon:  · Excessive pain, swelling, redness or odor of or around the surgical area  · Temperature over 100.5  · Nausea and vomiting lasting longer than 4 hours or if unable to take medications  · Any signs of decreased circulation or nerve impairment to extremity: change in color, persistent  numbness, tingling, coldness or increase pain  · Any questions        What to do at Home:  Recommended activity: Activity as tolerated, per MD    If you experience any of the following symptoms fever>101, pain unrelieved with medication, nausea/vomiting, shortness of breath unrelieved by rest, dizziness/fainting, chest pain. , please follow up with your doctor. *  Please give a list of your current medications to your Primary Care Provider. *  Please update this list whenever your medications are discontinued, doses are      changed, or new medications (including over-the-counter products) are added. *  Please carry medication information at all times in case of emergency situations. These are general instructions for a healthy lifestyle:    No smoking/ No tobacco products/ Avoid exposure to second hand smoke    Surgeon General's Warning:  Quitting smoking now greatly reduces serious risk to your health.     Obesity, smoking, and sedentary lifestyle greatly increases your risk for illness    A healthy diet, regular physical exercise & weight monitoring are important for maintaining a healthy lifestyle    You may be retaining fluid if you have a history of heart failure or if you experience any of the following symptoms:  Weight gain of 3 pounds or more overnight or 5 pounds in a week, increased swelling in our hands or feet or shortness of breath while lying flat in bed. Please call your doctor as soon as you notice any of these symptoms; do not wait until your next office visit. Recognize signs and symptoms of STROKE:    F-face looks uneven    A-arms unable to move or move unevenly    S-speech slurred or non-existent    T-time-call 911 as soon as signs and symptoms begin-DO NOT go       Back to bed or wait to see if you get better-TIME IS BRAIN. Warning Signs of HEART ATTACK     Call 911 if you have these symptoms:   Chest discomfort. Most heart attacks involve discomfort in the center of the chest that lasts more than a few minutes, or that goes away and comes back. It can feel like uncomfortable pressure, squeezing, fullness, or pain.  Discomfort in other areas of the upper body. Symptoms can include pain or discomfort in one or both arms, the back, neck, jaw, or stomach.  Shortness of breath with or without chest discomfort.  Other signs may include breaking out in a cold sweat, nausea, or lightheadedness. Don't wait more than five minutes to call 911 - MINUTES MATTER! Fast action can save your life. Calling 911 is almost always the fastest way to get lifesaving treatment. Emergency Medical Services staff can begin treatment when they arrive -- up to an hour sooner than if someone gets to the hospital by car. The discharge information has been reviewed with the patient. The patient verbalized understanding. Discharge medications reviewed with the patient and appropriate educational materials and side effects teaching were provided.                Learning About COPD, Asthma, and Air Pollution  How does air pollution affect COPD and asthma? When you have COPD or asthma, air pollution may make your symptoms worse. If it does, it means that air pollution is a trigger for you. It is important to know what your triggers are and how to deal with them. If air pollution is a trigger for you, you need to learn about air quality and pay attention to weather forecasts that include how bad the air is expected to be. How can you manage a flare-up caused by air pollution? · Do not panic. Quick treatment at home may help you prevent serious breathing problems. · Take your medicines exactly as your doctor tells you. ¨ Use your quick-relief inhaler as directed by your doctor. If your symptoms do not get better after you use your medicine, have someone take you to the emergency room. Call an ambulance if necessary. ¨ With inhaled medicines, a spacer or a nebulizer may help you get more medicine to your lungs. Ask your doctor or pharmacist how to use them properly. Practice using the spacer in front of a mirror before you have a flare-up. This may help you get the medicine into your lungs quickly. ¨ If your doctor has given you steroid pills, take them as directed. ¨ Talk to your doctor if you have any problems with your medicine. What can you do to prevent flare-ups? · Try not to be outside when air pollution levels are high. Stay at home with your windows closed. · Do not smoke. This is the most important step you can take to prevent more damage to your lungs and prevent problems. If you already smoke, it is never too late to stop. If you need help quitting, talk to your doctor about stop-smoking programs and medicines. These can increase your chances of quitting for good. · Avoid secondhand smoke; cold, dry air; and high altitudes. · Take your daily medicines as prescribed. · Avoid colds and flu. ¨ Get a pneumococcal vaccine. ¨ Get a flu vaccine each year, as soon as it is available.  Ask those you live or work with to do the same, so they will not get the flu and infect you. ¨ Try to stay away from people with colds or the flu. ¨ Wash your hands often. Follow-up care is a key part of your treatment and safety. Be sure to make and go to all appointments, and call your doctor if you are having problems. It's also a good idea to know your test results and keep a list of the medicines you take. Where can you learn more? Go to http://mic-mani.info/. Enter  in the search box to learn more about \"Learning About COPD, Asthma, and Air Pollution. \"  Current as of: May 23, 2016  Content Version: 11.1  © 8130-1829 [employer]. Care instructions adapted under license by [employer] (which disclaims liability or warranty for this information). If you have questions about a medical condition or this instruction, always ask your healthcare professional. [address] any warranty or liability for your use of this information.

## 2017-02-10 NOTE — PROGRESS NOTES
Discharge instructions and prescriptions provided and explained to the pt. Med side effect sheet reviewed. Opportunity for questions provided. Pt is waiting for son to arrive to take him home. Instructed to call once ready to leave.

## 2017-02-10 NOTE — PROGRESS NOTES
Hospitalist Discharge Summary     Patient ID:  Kristofer Mcnamara  [id number]  50 y.o.  1960  Admit date: 2/9/2017  9:41 AM  Discharge date and time: 2/10/2017  Attending: Constance Jaramillo MD  PCP:  Kofi Gee MD  Treatment Team: Attending Provider: Constance Jaramillo MD; Utilization Review: Giuliana Bourgeois RN    Principal Diagnosis Sepsis [facility])   Principal Problem:    Sepsis ([facility] [address] (2/9/2017)    Active Problems:    COPD exacerbation ([facility] [address] (2/9/2017)      Influenza (2/9/2017)      Acute respiratory failure with hypoxia ([facility] (2/9/2017)     HPI: 56yr old pleasant male with a PMHX sig for HTN and mild copd. States that he developed an sinus infection that he has been fighting for about 2 weeks. Started to feel worse on Monday, weak, generalized aches and pains- cough - sob. Went to PCP but still felt worse so came here. In the ER found to be septic secondary to influenza A, Copd exacerbation- hypoxic resp failure- hospitalist asked to admit. Hospital Course:  Please refer to the admission H&P for details of presentation. In summary, the patient presented with dyspnea related to copd and influenza infection. Started on tamiflu and solumedrol on admission. Symptoms are improving. Pt remains afebrile. Now stable on room air. Will complete 5 day course of tamiflu. Given prednisone 10 mg daily for additional 5 days. Pt is currently stable for dc to home, will follow up with pcp.          Labs: Results:       Chemistry Recent Labs      02/10/17   0530  02/09/17   0947   GLU  121*  121*   NA  133*  131*   K  3.5  4.1   CL  102  93*   CO2  24  23   BUN  7  10   CREA  0.83  1.31   CA  7.6*  9.8   AGAP  7  15   AP  64  113   TP  6.0*  9.5*   ALB  2.6*  4.2   GLOB  3.4  5.3*   AGRAT  0.8*  0.8*      CBC w/Diff Recent Labs      02/10/17   0530  02/09/17   0947   WBC  6.1  13.0*   RBC  3.83*  5.15   HGB  13.9  18.7*   HCT  40.7*  53.9*   PLT  206  280   GRANS  72  85*   LYMPH  20  9*   EOS  0*  0* Cardiac Enzymes No results for input(s): CPK, CKND1, LALITHA in the last 72 hours. No lab exists for component: CKRMB, TROIP   Coagulation No results for input(s): PTP, INR, APTT in the last 72 hours. No lab exists for component: INREXT    Lipid Panel Lab Results   Component Value Date/Time    Cholesterol, total 166 06/29/2016 11:30 AM    HDL Cholesterol 111 06/29/2016 11:30 AM    LDL, calculated 40 06/29/2016 11:30 AM    VLDL, calculated 15 06/29/2016 11:30 AM    Triglyceride 74 06/29/2016 11:30 AM      BNP No results for input(s): BNPP in the last 72 hours. Liver Enzymes Recent Labs      02/10/17   0530   TP  6.0*   ALB  2.6*   AP  64   SGOT  25      Thyroid Studies No results found for: T4, T3U, TSH, TSHEXT         Discharge Exam:  Visit Vitals    /82 (BP 1 Location: Left arm, BP Patient Position: At rest)    Pulse 89    Temp 97.8 °F (36.6 °C)    Resp 16    Ht 6' 1\" (1.854 m)    Wt 63.5 kg (140 lb)    SpO2 95%    BMI 18.47 kg/m2     General appearance: alert, cooperative, no distress, appears stated age  Lungs: diminished at bases, no rhonchi  Heart: regular rate and rhythm, S1, S2 normal, no murmur, click, rub or gallop  Abdomen: soft, non-tender. Bowel sounds normal. No masses,  no organomegaly  Extremities: no cyanosis or edema  Neurologic: Grossly normal    Disposition: home  Discharge Condition: stable  Patient Instructions:   Current Discharge Medication List      START taking these medications    Details   oseltamivir (TAMIFLU) 30 mg capsule Take 1 Cap by mouth two (2) times a day for 3 days. Qty: 6 Cap, Refills: 0      predniSONE (DELTASONE) 10 mg tablet Take 1 Tab by mouth daily (with breakfast) for 5 days. Qty: 5 Tab, Refills: 0         CONTINUE these medications which have NOT CHANGED    Details   gabapentin (NEURONTIN) 300 mg capsule Take 1 Cap by mouth nightly. Take 2 hrs before bedtime.   Qty: 30 Cap, Refills: 5    Associated Diagnoses: Restless leg syndrome      tiotropium bromide (SPIRIVA RESPIMAT) 2.5 mcg/actuation inhaler Take 2 Puffs by inhalation daily. Qty: 1 Inhaler, Refills: 0    Associated Diagnoses: Chronic obstructive pulmonary disease, unspecified COPD type ([facility])      albuterol (PROVENTIL HFA, VENTOLIN HFA, PROAIR HFA) 90 mcg/actuation inhaler Take 1 Puff by inhalation every six (6) hours as needed for Wheezing. Qty: 1 Inhaler, Refills: 3    Associated Diagnoses: Chronic obstructive pulmonary disease, unspecified COPD type ([address] Lung mass; Tobacco abuse      Hydrochlorothiazide (HYDRODIURIL) 12.5 mg tablet Take 1 Tab by mouth daily.   Qty: 30 Tab, Refills: 3    Associated Diagnoses: Essential hypertension             Activity: Activity as tolerated  Diet: Regular Diet  Wound Care: None needed    Follow-up  ·   With pcp  Time spent to discharge patient 35 minutes  Signed:  Riccardo Darden MD  2/10/2017  1:33 PM

## 2017-02-14 LAB
BACTERIA SPEC CULT: NORMAL
BACTERIA SPEC CULT: NORMAL
SERVICE CMNT-IMP: NORMAL
SERVICE CMNT-IMP: NORMAL

## 2017-02-17 PROBLEM — R79.89 ELEVATED FERRITIN: Status: ACTIVE | Noted: 2017-02-17

## 2017-02-20 PROBLEM — A41.9 SEPSIS (HCC): Status: RESOLVED | Noted: 2017-02-09 | Resolved: 2017-02-20

## 2017-02-20 PROBLEM — E88.09 HYPOALBUMINEMIA: Status: ACTIVE | Noted: 2017-02-20

## 2017-02-20 PROBLEM — J96.01 ACUTE RESPIRATORY FAILURE WITH HYPOXIA (HCC): Status: RESOLVED | Noted: 2017-02-09 | Resolved: 2017-02-20

## 2017-02-20 PROBLEM — J11.1 INFLUENZA: Status: RESOLVED | Noted: 2017-02-09 | Resolved: 2017-02-20

## 2017-02-20 PROBLEM — J44.1 COPD EXACERBATION (HCC): Status: RESOLVED | Noted: 2017-02-09 | Resolved: 2017-02-20

## 2017-05-23 PROBLEM — R79.89 ELEVATED FERRITIN: Status: RESOLVED | Noted: 2017-02-17 | Resolved: 2017-05-23

## 2017-05-23 PROBLEM — E88.09 HYPOALBUMINEMIA: Status: RESOLVED | Noted: 2017-02-20 | Resolved: 2017-05-23

## 2017-05-30 ENCOUNTER — HOSPITAL ENCOUNTER (OUTPATIENT)
Dept: CT IMAGING | Age: 57
Discharge: HOME | End: 2017-05-30
Attending: INTERNAL MEDICINE
Payer: COMMERCIAL

## 2017-05-30 DIAGNOSIS — R91.1 PULMONARY NODULE: ICD-10-CM

## 2017-05-30 PROCEDURE — 71250 CT THORAX DX C-: CPT

## 2017-05-31 NOTE — PROGRESS NOTES
CT scan showed irregular lesion has decreased in size suggesting inflammatory rather than malignant condition. They do recommend CT scan again in 6 mos.

## 2017-11-22 PROBLEM — E83.52 HYPERCALCEMIA: Status: ACTIVE | Noted: 2017-11-22

## 2017-12-01 ENCOUNTER — HOSPITAL ENCOUNTER (OUTPATIENT)
Dept: CT IMAGING | Age: 57
Discharge: HOME | End: 2017-12-01
Attending: INTERNAL MEDICINE
Payer: COMMERCIAL

## 2017-12-01 DIAGNOSIS — R91.1 PULMONARY NODULE: ICD-10-CM

## 2017-12-01 PROCEDURE — 71250 CT THORAX DX C-: CPT

## 2018-03-29 ENCOUNTER — HOSPITAL ENCOUNTER (OUTPATIENT)
Dept: GENERAL RADIOLOGY | Age: 58
Discharge: HOME | End: 2018-03-29
Attending: INTERNAL MEDICINE
Payer: MEDICARE

## 2018-03-29 DIAGNOSIS — M25.562 PAIN IN BOTH KNEES, UNSPECIFIED CHRONICITY: ICD-10-CM

## 2018-03-29 DIAGNOSIS — M25.561 PAIN IN BOTH KNEES, UNSPECIFIED CHRONICITY: ICD-10-CM

## 2018-03-29 PROCEDURE — 73564 X-RAY EXAM KNEE 4 OR MORE: CPT

## 2018-05-29 PROBLEM — E83.52 HYPERCALCEMIA: Status: RESOLVED | Noted: 2017-11-22 | Resolved: 2018-05-29

## 2018-06-28 ENCOUNTER — PATIENT OUTREACH (OUTPATIENT)
Dept: CASE MANAGEMENT | Age: 58
End: 2018-06-28

## 2018-06-28 NOTE — PROGRESS NOTES
Medication Adherence Outreach    Date/Time of Call:  6/28/2018  11:40 am    Name of medication and dosage:   * Losartin 50 mg 1 every day    Does the patient know the purpose and dosage of medication? * Yes , discussed purpose of medication. Are you getting a #30 day or #90 day supply of your medication? * 90 day supply    Are you still taking this medication? If not, why?     * No, Mr. Jasmeet Rosen states he has not been taking his medication. Discussed importance of being adherent with medication. Advised Mr. Prescott to take medication daily. Mr. Jasmeet Rosen verbalized understanding of advise given. Transportation issues or any problems paying for the medication or other reason? * No    What pharmacy are you using to fill your medication and do you know the last time that you got your medication filled? * [facility]   * [facility] and [facility] ready for patient to        Are you having any side effects from taking your medication? * No       Any other questions or concerns expressed by the patient. * No    Comments:  * Discussed medication adherence with Mr. Jasmeet Rosen and he is agreement to start taking Losartin daily. Will outreach with Mr. Jasmeet Rosen 7/3/2018 regarding adherence.           Call Completed By:  [id number] YAMILETH Thompson.

## 2018-07-03 ENCOUNTER — PATIENT OUTREACH (OUTPATIENT)
Dept: CASE MANAGEMENT | Age: 58
End: 2018-07-03

## 2018-07-05 ENCOUNTER — PATIENT OUTREACH (OUTPATIENT)
Dept: CASE MANAGEMENT | Age: 58
End: 2018-07-05

## 2018-07-05 NOTE — PROGRESS NOTES
Medication outreach call regarding Losartin. Mr Emily Brambila states he is taking Losartin 50 mg  daily with no side effects. Mr. Emily Brambila understands  the importance of being adherent with medication. He has no current barriers to prevent him from obtaining or takiing his medication.

## 2018-09-25 PROBLEM — F17.210 CIGARETTE SMOKER: Status: ACTIVE | Noted: 2018-09-25

## 2018-12-26 PROBLEM — F41.9 ANXIETY: Status: ACTIVE | Noted: 2018-12-26

## 2019-01-08 ENCOUNTER — HOSPITAL ENCOUNTER (OUTPATIENT)
Dept: CT IMAGING | Age: 59
Discharge: HOME | End: 2019-01-08
Attending: INTERNAL MEDICINE
Payer: MEDICARE

## 2019-01-08 VITALS — BODY MASS INDEX: 16.7 KG/M2 | WEIGHT: 126 LBS | HEIGHT: 73 IN

## 2019-01-08 DIAGNOSIS — F17.200 TOBACCO USE DISORDER: ICD-10-CM

## 2019-01-08 PROCEDURE — G0297 LDCT FOR LUNG CA SCREEN: HCPCS

## 2019-07-01 ENCOUNTER — HOSPITAL ENCOUNTER (OUTPATIENT)
Dept: CT IMAGING | Age: 59
Discharge: HOME | End: 2019-07-01
Attending: INTERNAL MEDICINE

## 2019-07-01 DIAGNOSIS — J44.9 COPD (CHRONIC OBSTRUCTIVE PULMONARY DISEASE) (HCC): ICD-10-CM

## 2019-07-01 DIAGNOSIS — R93.89 ABNORMAL FINDING ON RADIOLOGY EXAM: ICD-10-CM

## 2019-07-01 DIAGNOSIS — R63.4 LOSS OF WEIGHT: ICD-10-CM

## 2019-07-01 DIAGNOSIS — F10.90 HEAVY ALCOHOL USE: ICD-10-CM

## 2019-07-01 DIAGNOSIS — R91.1 PULMONARY NODULE: ICD-10-CM

## 2019-07-01 DIAGNOSIS — F17.200 SMOKER: ICD-10-CM

## 2019-07-01 DIAGNOSIS — R68.81 EARLY SATIETY: ICD-10-CM

## 2019-07-01 RX ORDER — SODIUM CHLORIDE 0.9 % (FLUSH) 0.9 %
10 SYRINGE (ML) INJECTION
Status: COMPLETED | OUTPATIENT
Start: 2019-07-01 | End: 2019-07-01

## 2019-07-01 RX ADMIN — Medication 10 ML: at 12:46

## 2020-01-01 ENCOUNTER — HOSPITAL ENCOUNTER (INPATIENT)
Age: 60
LOS: 8 days | Discharge: NURSING, REHAB OR LONG-TERM CARE | DRG: 640 | End: 2020-11-05
Attending: EMERGENCY MEDICINE | Admitting: FAMILY MEDICINE
Payer: MEDICARE

## 2020-01-01 ENCOUNTER — APPOINTMENT (OUTPATIENT)
Dept: GENERAL RADIOLOGY | Age: 60
DRG: 640 | End: 2020-01-01
Attending: EMERGENCY MEDICINE
Payer: MEDICARE

## 2020-01-01 ENCOUNTER — PATIENT OUTREACH (OUTPATIENT)
Dept: CASE MANAGEMENT | Age: 60
End: 2020-01-01

## 2020-01-01 ENCOUNTER — HOSPITAL ENCOUNTER (OUTPATIENT)
Dept: GENERAL RADIOLOGY | Age: 60
Discharge: HOME | End: 2020-12-28

## 2020-01-01 VITALS
OXYGEN SATURATION: 96 % | RESPIRATION RATE: 18 BRPM | DIASTOLIC BLOOD PRESSURE: 65 MMHG | TEMPERATURE: 97.6 F | HEART RATE: 93 BPM | WEIGHT: 100 LBS | BODY MASS INDEX: 13.54 KG/M2 | HEIGHT: 72 IN | SYSTOLIC BLOOD PRESSURE: 99 MMHG

## 2020-01-01 DIAGNOSIS — N17.9 AKI (ACUTE KIDNEY INJURY) (HCC): Primary | ICD-10-CM

## 2020-01-01 DIAGNOSIS — R62.7 ADULT FAILURE TO THRIVE: ICD-10-CM

## 2020-01-01 DIAGNOSIS — G62.9 PERIPHERAL POLYNEUROPATHY: ICD-10-CM

## 2020-01-01 DIAGNOSIS — G89.29 CHRONIC RIGHT SHOULDER PAIN: ICD-10-CM

## 2020-01-01 DIAGNOSIS — F41.9 ANXIETY: ICD-10-CM

## 2020-01-01 DIAGNOSIS — E87.1 HYPONATREMIA: ICD-10-CM

## 2020-01-01 DIAGNOSIS — M25.511 CHRONIC RIGHT SHOULDER PAIN: ICD-10-CM

## 2020-01-01 DIAGNOSIS — E87.6 HYPOKALEMIA: ICD-10-CM

## 2020-01-01 LAB
ALBUMIN SERPL-MCNC: 1.8 G/DL (ref 3.2–4.6)
ALBUMIN SERPL-MCNC: 2 G/DL (ref 3.2–4.6)
ALBUMIN SERPL-MCNC: 2.4 G/DL (ref 3.2–4.6)
ALBUMIN SERPL-MCNC: 2.6 G/DL (ref 3.2–4.6)
ALBUMIN/GLOB SERPL: 0.4 {RATIO} (ref 1.2–3.5)
ALBUMIN/GLOB SERPL: 0.6 {RATIO} (ref 1.2–3.5)
ALBUMIN/GLOB SERPL: 0.7 {RATIO} (ref 1.2–3.5)
ALP SERPL-CCNC: 108 U/L (ref 50–136)
ALP SERPL-CCNC: 118 U/L (ref 50–136)
ALP SERPL-CCNC: 118 U/L (ref 50–136)
ALT SERPL-CCNC: 24 U/L (ref 12–65)
ALT SERPL-CCNC: 31 U/L (ref 12–65)
ALT SERPL-CCNC: 31 U/L (ref 12–65)
ANION GAP SERPL CALC-SCNC: 10 MMOL/L (ref 7–16)
ANION GAP SERPL CALC-SCNC: 11 MMOL/L (ref 7–16)
ANION GAP SERPL CALC-SCNC: 12 MMOL/L (ref 7–16)
ANION GAP SERPL CALC-SCNC: 12 MMOL/L (ref 7–16)
ANION GAP SERPL CALC-SCNC: 13 MMOL/L (ref 7–16)
ANION GAP SERPL CALC-SCNC: 14 MMOL/L (ref 7–16)
ANION GAP SERPL CALC-SCNC: 15 MMOL/L (ref 7–16)
ANION GAP SERPL CALC-SCNC: 15 MMOL/L (ref 7–16)
ANION GAP SERPL CALC-SCNC: 3 MMOL/L (ref 7–16)
ANION GAP SERPL CALC-SCNC: 5 MMOL/L (ref 7–16)
ANION GAP SERPL CALC-SCNC: 5 MMOL/L (ref 7–16)
ANION GAP SERPL CALC-SCNC: 6 MMOL/L (ref 7–16)
ANION GAP SERPL CALC-SCNC: 6 MMOL/L (ref 7–16)
ANION GAP SERPL CALC-SCNC: 8 MMOL/L (ref 7–16)
ANION GAP SERPL CALC-SCNC: 9 MMOL/L (ref 7–16)
AST SERPL-CCNC: 101 U/L (ref 15–37)
AST SERPL-CCNC: 27 U/L (ref 15–37)
AST SERPL-CCNC: 35 U/L (ref 15–37)
ATRIAL RATE: 85 BPM
BASOPHILS # BLD: 0 K/UL (ref 0–0.2)
BASOPHILS NFR BLD: 0 % (ref 0–2)
BILIRUB SERPL-MCNC: 0.7 MG/DL (ref 0.2–1.1)
BILIRUB SERPL-MCNC: 1.3 MG/DL (ref 0.2–1.1)
BILIRUB SERPL-MCNC: 1.5 MG/DL (ref 0.2–1.1)
BUN SERPL-MCNC: 11 MG/DL (ref 8–23)
BUN SERPL-MCNC: 13 MG/DL (ref 8–23)
BUN SERPL-MCNC: 14 MG/DL (ref 8–23)
BUN SERPL-MCNC: 20 MG/DL (ref 8–23)
BUN SERPL-MCNC: 23 MG/DL (ref 8–23)
BUN SERPL-MCNC: 26 MG/DL (ref 8–23)
BUN SERPL-MCNC: 28 MG/DL (ref 8–23)
BUN SERPL-MCNC: 30 MG/DL (ref 8–23)
BUN SERPL-MCNC: 33 MG/DL (ref 8–23)
BUN SERPL-MCNC: 43 MG/DL (ref 8–23)
BUN SERPL-MCNC: 48 MG/DL (ref 8–23)
BUN SERPL-MCNC: 55 MG/DL (ref 8–23)
BUN SERPL-MCNC: 63 MG/DL (ref 8–23)
BUN SERPL-MCNC: 71 MG/DL (ref 8–23)
BUN SERPL-MCNC: 78 MG/DL (ref 8–23)
BUN SERPL-MCNC: 79 MG/DL (ref 8–23)
BUN SERPL-MCNC: 9 MG/DL (ref 8–23)
CALCIUM SERPL-MCNC: 7.4 MG/DL (ref 8.3–10.4)
CALCIUM SERPL-MCNC: 7.7 MG/DL (ref 8.3–10.4)
CALCIUM SERPL-MCNC: 7.9 MG/DL (ref 8.3–10.4)
CALCIUM SERPL-MCNC: 8.1 MG/DL (ref 8.3–10.4)
CALCIUM SERPL-MCNC: 8.4 MG/DL (ref 8.3–10.4)
CALCIUM SERPL-MCNC: 8.5 MG/DL (ref 8.3–10.4)
CALCIUM SERPL-MCNC: 8.6 MG/DL (ref 8.3–10.4)
CALCIUM SERPL-MCNC: 8.6 MG/DL (ref 8.3–10.4)
CALCIUM SERPL-MCNC: 8.8 MG/DL (ref 8.3–10.4)
CALCIUM SERPL-MCNC: 8.9 MG/DL (ref 8.3–10.4)
CALCIUM SERPL-MCNC: 9 MG/DL (ref 8.3–10.4)
CALCIUM SERPL-MCNC: 9 MG/DL (ref 8.3–10.4)
CALCULATED P AXIS, ECG09: 85 DEGREES
CALCULATED R AXIS, ECG10: 100 DEGREES
CALCULATED T AXIS, ECG11: 81 DEGREES
CHLORIDE SERPL-SCNC: 100 MMOL/L (ref 98–107)
CHLORIDE SERPL-SCNC: 103 MMOL/L (ref 98–107)
CHLORIDE SERPL-SCNC: 103 MMOL/L (ref 98–107)
CHLORIDE SERPL-SCNC: 104 MMOL/L (ref 98–107)
CHLORIDE SERPL-SCNC: 105 MMOL/L (ref 98–107)
CHLORIDE SERPL-SCNC: 105 MMOL/L (ref 98–107)
CHLORIDE SERPL-SCNC: 106 MMOL/L (ref 98–107)
CHLORIDE SERPL-SCNC: 107 MMOL/L (ref 98–107)
CHLORIDE SERPL-SCNC: 107 MMOL/L (ref 98–107)
CHLORIDE SERPL-SCNC: 108 MMOL/L (ref 98–107)
CHLORIDE SERPL-SCNC: 109 MMOL/L (ref 98–107)
CHLORIDE SERPL-SCNC: 110 MMOL/L (ref 98–107)
CHLORIDE SERPL-SCNC: 111 MMOL/L (ref 98–107)
CHLORIDE SERPL-SCNC: 112 MMOL/L (ref 98–107)
CHLORIDE SERPL-SCNC: 113 MMOL/L (ref 98–107)
CHLORIDE SERPL-SCNC: 94 MMOL/L (ref 98–107)
CHLORIDE SERPL-SCNC: 96 MMOL/L (ref 98–107)
CO2 SERPL-SCNC: 14 MMOL/L (ref 21–32)
CO2 SERPL-SCNC: 16 MMOL/L (ref 21–32)
CO2 SERPL-SCNC: 17 MMOL/L (ref 21–32)
CO2 SERPL-SCNC: 19 MMOL/L (ref 21–32)
CO2 SERPL-SCNC: 20 MMOL/L (ref 21–32)
CO2 SERPL-SCNC: 21 MMOL/L (ref 21–32)
CO2 SERPL-SCNC: 25 MMOL/L (ref 21–32)
CO2 SERPL-SCNC: 25 MMOL/L (ref 21–32)
CO2 SERPL-SCNC: 27 MMOL/L (ref 21–32)
CO2 SERPL-SCNC: 29 MMOL/L (ref 21–32)
CO2 SERPL-SCNC: 29 MMOL/L (ref 21–32)
COVID-19 RAPID TEST, COVR: NOT DETECTED
CREAT SERPL-MCNC: 0.44 MG/DL (ref 0.8–1.5)
CREAT SERPL-MCNC: 0.53 MG/DL (ref 0.8–1.5)
CREAT SERPL-MCNC: 0.55 MG/DL (ref 0.8–1.5)
CREAT SERPL-MCNC: 0.55 MG/DL (ref 0.8–1.5)
CREAT SERPL-MCNC: 0.6 MG/DL (ref 0.8–1.5)
CREAT SERPL-MCNC: 0.62 MG/DL (ref 0.8–1.5)
CREAT SERPL-MCNC: 0.76 MG/DL (ref 0.8–1.5)
CREAT SERPL-MCNC: 0.77 MG/DL (ref 0.8–1.5)
CREAT SERPL-MCNC: 0.79 MG/DL (ref 0.8–1.5)
CREAT SERPL-MCNC: 0.91 MG/DL (ref 0.8–1.5)
CREAT SERPL-MCNC: 0.96 MG/DL (ref 0.8–1.5)
CREAT SERPL-MCNC: 1 MG/DL (ref 0.8–1.5)
CREAT SERPL-MCNC: 1.01 MG/DL (ref 0.8–1.5)
CREAT SERPL-MCNC: 1.05 MG/DL (ref 0.8–1.5)
CREAT SERPL-MCNC: 1.07 MG/DL (ref 0.8–1.5)
CREAT SERPL-MCNC: 1.19 MG/DL (ref 0.8–1.5)
CREAT SERPL-MCNC: 1.28 MG/DL (ref 0.8–1.5)
DIAGNOSIS, 93000: NORMAL
DIFFERENTIAL METHOD BLD: ABNORMAL
EOSINOPHIL # BLD: 0.1 K/UL (ref 0–0.8)
EOSINOPHIL NFR BLD: 1 % (ref 0.5–7.8)
ERYTHROCYTE [DISTWIDTH] IN BLOOD BY AUTOMATED COUNT: 13.3 % (ref 11.9–14.6)
ERYTHROCYTE [DISTWIDTH] IN BLOOD BY AUTOMATED COUNT: 13.5 % (ref 11.9–14.6)
ERYTHROCYTE [DISTWIDTH] IN BLOOD BY AUTOMATED COUNT: 14.1 % (ref 11.9–14.6)
GLOBULIN SER CALC-MCNC: 2.9 G/DL (ref 2.3–3.5)
GLOBULIN SER CALC-MCNC: 4.3 G/DL (ref 2.3–3.5)
GLOBULIN SER CALC-MCNC: 5.5 G/DL (ref 2.3–3.5)
GLUCOSE SERPL-MCNC: 100 MG/DL (ref 65–100)
GLUCOSE SERPL-MCNC: 104 MG/DL (ref 65–100)
GLUCOSE SERPL-MCNC: 107 MG/DL (ref 65–100)
GLUCOSE SERPL-MCNC: 110 MG/DL (ref 65–100)
GLUCOSE SERPL-MCNC: 122 MG/DL (ref 65–100)
GLUCOSE SERPL-MCNC: 125 MG/DL (ref 65–100)
GLUCOSE SERPL-MCNC: 141 MG/DL (ref 65–100)
GLUCOSE SERPL-MCNC: 154 MG/DL (ref 65–100)
GLUCOSE SERPL-MCNC: 68 MG/DL (ref 65–100)
GLUCOSE SERPL-MCNC: 77 MG/DL (ref 65–100)
GLUCOSE SERPL-MCNC: 82 MG/DL (ref 65–100)
GLUCOSE SERPL-MCNC: 83 MG/DL (ref 65–100)
GLUCOSE SERPL-MCNC: 86 MG/DL (ref 65–100)
GLUCOSE SERPL-MCNC: 87 MG/DL (ref 65–100)
GLUCOSE SERPL-MCNC: 91 MG/DL (ref 65–100)
GLUCOSE SERPL-MCNC: 98 MG/DL (ref 65–100)
GLUCOSE SERPL-MCNC: 98 MG/DL (ref 65–100)
HCT VFR BLD AUTO: 27.6 % (ref 41.1–50.3)
HCT VFR BLD AUTO: 27.9 % (ref 41.1–50.3)
HCT VFR BLD AUTO: 41.2 % (ref 41.1–50.3)
HGB BLD-MCNC: 10.1 G/DL (ref 13.6–17.2)
HGB BLD-MCNC: 10.3 G/DL (ref 13.6–17.2)
HGB BLD-MCNC: 15.4 G/DL (ref 13.6–17.2)
IMM GRANULOCYTES # BLD AUTO: 0 K/UL (ref 0–0.5)
IMM GRANULOCYTES # BLD AUTO: 0.1 K/UL (ref 0–0.5)
IMM GRANULOCYTES # BLD AUTO: 0.2 K/UL (ref 0–0.5)
IMM GRANULOCYTES NFR BLD AUTO: 0 % (ref 0–5)
IMM GRANULOCYTES NFR BLD AUTO: 1 % (ref 0–5)
IMM GRANULOCYTES NFR BLD AUTO: 2 % (ref 0–5)
LACTATE SERPL-SCNC: 2.1 MMOL/L (ref 0.4–2)
LYMPHOCYTES # BLD: 0.8 K/UL (ref 0.5–4.6)
LYMPHOCYTES # BLD: 1.2 K/UL (ref 0.5–4.6)
LYMPHOCYTES # BLD: 1.7 K/UL (ref 0.5–4.6)
LYMPHOCYTES NFR BLD: 15 % (ref 13–44)
LYMPHOCYTES NFR BLD: 20 % (ref 13–44)
LYMPHOCYTES NFR BLD: 6 % (ref 13–44)
MAGNESIUM SERPL-MCNC: 1.5 MG/DL (ref 1.8–2.4)
MAGNESIUM SERPL-MCNC: 1.7 MG/DL (ref 1.8–2.4)
MAGNESIUM SERPL-MCNC: 1.7 MG/DL (ref 1.8–2.4)
MAGNESIUM SERPL-MCNC: 1.8 MG/DL (ref 1.8–2.4)
MAGNESIUM SERPL-MCNC: 1.9 MG/DL (ref 1.8–2.4)
MAGNESIUM SERPL-MCNC: 1.9 MG/DL (ref 1.8–2.4)
MAGNESIUM SERPL-MCNC: 2.1 MG/DL (ref 1.8–2.4)
MAGNESIUM SERPL-MCNC: 2.2 MG/DL (ref 1.8–2.4)
MAGNESIUM SERPL-MCNC: 2.5 MG/DL (ref 1.8–2.4)
MCH RBC QN AUTO: 33.8 PG (ref 26.1–32.9)
MCH RBC QN AUTO: 33.8 PG (ref 26.1–32.9)
MCH RBC QN AUTO: 34.1 PG (ref 26.1–32.9)
MCHC RBC AUTO-ENTMCNC: 36.6 G/DL (ref 31.4–35)
MCHC RBC AUTO-ENTMCNC: 36.9 G/DL (ref 31.4–35)
MCHC RBC AUTO-ENTMCNC: 37.4 G/DL (ref 31.4–35)
MCV RBC AUTO: 90.5 FL (ref 79.6–97.8)
MCV RBC AUTO: 91.5 FL (ref 79.6–97.8)
MCV RBC AUTO: 93.2 FL (ref 79.6–97.8)
MM INDURATION POC: 0 MM (ref 0–5)
MONOCYTES # BLD: 0.5 K/UL (ref 0.1–1.3)
MONOCYTES # BLD: 0.7 K/UL (ref 0.1–1.3)
MONOCYTES # BLD: 1.2 K/UL (ref 0.1–1.3)
MONOCYTES NFR BLD: 15 % (ref 4–12)
MONOCYTES NFR BLD: 4 % (ref 4–12)
MONOCYTES NFR BLD: 9 % (ref 4–12)
NEUTS SEG # BLD: 11.9 K/UL (ref 1.7–8.2)
NEUTS SEG # BLD: 5.1 K/UL (ref 1.7–8.2)
NEUTS SEG # BLD: 5.8 K/UL (ref 1.7–8.2)
NEUTS SEG NFR BLD: 62 % (ref 43–78)
NEUTS SEG NFR BLD: 75 % (ref 43–78)
NEUTS SEG NFR BLD: 88 % (ref 43–78)
NRBC # BLD: 0 K/UL (ref 0–0.2)
NRBC # BLD: 0.05 K/UL (ref 0–0.2)
NRBC # BLD: 0.05 K/UL (ref 0–0.2)
P-R INTERVAL, ECG05: 140 MS
PHOSPHATE SERPL-MCNC: 0.4 MG/DL (ref 2.3–3.7)
PHOSPHATE SERPL-MCNC: 2.1 MG/DL (ref 2.3–3.7)
PHOSPHATE SERPL-MCNC: 3 MG/DL (ref 2.3–3.7)
PHOSPHATE SERPL-MCNC: 3.1 MG/DL (ref 2.3–3.7)
PHOSPHATE SERPL-MCNC: 3.5 MG/DL (ref 2.3–3.7)
PLATELET # BLD AUTO: 138 K/UL (ref 150–450)
PLATELET # BLD AUTO: 143 K/UL (ref 150–450)
PLATELET # BLD AUTO: 188 K/UL (ref 150–450)
PMV BLD AUTO: 10.5 FL (ref 9.4–12.3)
PMV BLD AUTO: 10.5 FL (ref 9.4–12.3)
PMV BLD AUTO: 12.1 FL (ref 9.4–12.3)
POTASSIUM SERPL-SCNC: 1.6 MMOL/L (ref 3.5–5.1)
POTASSIUM SERPL-SCNC: 1.7 MMOL/L (ref 3.5–5.1)
POTASSIUM SERPL-SCNC: 1.8 MMOL/L (ref 3.5–5.1)
POTASSIUM SERPL-SCNC: 1.9 MMOL/L (ref 3.5–5.1)
POTASSIUM SERPL-SCNC: 2 MMOL/L (ref 3.5–5.1)
POTASSIUM SERPL-SCNC: 2 MMOL/L (ref 3.5–5.1)
POTASSIUM SERPL-SCNC: 2.2 MMOL/L (ref 3.5–5.1)
POTASSIUM SERPL-SCNC: 2.3 MMOL/L (ref 3.5–5.1)
POTASSIUM SERPL-SCNC: 2.4 MMOL/L (ref 3.5–5.1)
POTASSIUM SERPL-SCNC: 3 MMOL/L (ref 3.5–5.1)
POTASSIUM SERPL-SCNC: 3 MMOL/L (ref 3.5–5.1)
POTASSIUM SERPL-SCNC: 3.1 MMOL/L (ref 3.5–5.1)
POTASSIUM SERPL-SCNC: 3.2 MMOL/L (ref 3.5–5.1)
POTASSIUM SERPL-SCNC: 3.2 MMOL/L (ref 3.5–5.1)
POTASSIUM SERPL-SCNC: 3.3 MMOL/L (ref 3.5–5.1)
POTASSIUM SERPL-SCNC: 3.6 MMOL/L (ref 3.5–5.1)
POTASSIUM SERPL-SCNC: 3.7 MMOL/L (ref 3.5–5.1)
POTASSIUM SERPL-SCNC: 4 MMOL/L (ref 3.5–5.1)
POTASSIUM SERPL-SCNC: 4 MMOL/L (ref 3.5–5.1)
POTASSIUM SERPL-SCNC: 4.6 MMOL/L (ref 3.5–5.1)
POTASSIUM SERPL-SCNC: 6 MMOL/L (ref 3.5–5.1)
PPD POC: NEGATIVE NEGATIVE
PROT SERPL-MCNC: 4.9 G/DL (ref 6.3–8.2)
PROT SERPL-MCNC: 6.9 G/DL (ref 6.3–8.2)
PROT SERPL-MCNC: 7.9 G/DL (ref 6.3–8.2)
Q-T INTERVAL, ECG07: 346 MS
QRS DURATION, ECG06: 86 MS
QTC CALCULATION (BEZET), ECG08: 411 MS
RBC # BLD AUTO: 2.96 M/UL (ref 4.23–5.6)
RBC # BLD AUTO: 3.05 M/UL (ref 4.23–5.6)
RBC # BLD AUTO: 4.55 M/UL (ref 4.23–5.6)
SODIUM SERPL-SCNC: 125 MMOL/L (ref 138–145)
SODIUM SERPL-SCNC: 130 MMOL/L (ref 138–145)
SODIUM SERPL-SCNC: 133 MMOL/L (ref 138–145)
SODIUM SERPL-SCNC: 134 MMOL/L (ref 138–145)
SODIUM SERPL-SCNC: 135 MMOL/L (ref 138–145)
SODIUM SERPL-SCNC: 136 MMOL/L (ref 136–145)
SODIUM SERPL-SCNC: 136 MMOL/L (ref 136–145)
SODIUM SERPL-SCNC: 137 MMOL/L (ref 136–145)
SODIUM SERPL-SCNC: 137 MMOL/L (ref 138–145)
SODIUM SERPL-SCNC: 138 MMOL/L (ref 136–145)
SODIUM SERPL-SCNC: 138 MMOL/L (ref 136–145)
SODIUM SERPL-SCNC: 138 MMOL/L (ref 138–145)
SODIUM SERPL-SCNC: 139 MMOL/L (ref 136–145)
SODIUM SERPL-SCNC: 140 MMOL/L (ref 136–145)
SODIUM SERPL-SCNC: 140 MMOL/L (ref 138–145)
SODIUM SERPL-SCNC: 140 MMOL/L (ref 138–145)
SODIUM SERPL-SCNC: 141 MMOL/L (ref 136–145)
SOURCE, COVRS: NORMAL
TSH SERPL DL<=0.005 MIU/L-ACNC: 2.73 UIU/ML (ref 0.36–3.74)
VENTRICULAR RATE, ECG03: 85 BPM
VIT B12 SERPL-MCNC: 1083 PG/ML (ref 193–986)
WBC # BLD AUTO: 13.5 K/UL (ref 4.3–11.1)
WBC # BLD AUTO: 7.8 K/UL (ref 4.3–11.1)
WBC # BLD AUTO: 8.3 K/UL (ref 4.3–11.1)

## 2020-01-01 PROCEDURE — 83735 ASSAY OF MAGNESIUM: CPT

## 2020-01-01 PROCEDURE — 94640 AIRWAY INHALATION TREATMENT: CPT

## 2020-01-01 PROCEDURE — 97165 OT EVAL LOW COMPLEX 30 MIN: CPT

## 2020-01-01 PROCEDURE — 81003 URINALYSIS AUTO W/O SCOPE: CPT

## 2020-01-01 PROCEDURE — 80048 BASIC METABOLIC PNL TOTAL CA: CPT

## 2020-01-01 PROCEDURE — 94664 DEMO&/EVAL PT USE INHALER: CPT

## 2020-01-01 PROCEDURE — 84132 ASSAY OF SERUM POTASSIUM: CPT

## 2020-01-01 PROCEDURE — 80069 RENAL FUNCTION PANEL: CPT

## 2020-01-01 PROCEDURE — 74011250636 HC RX REV CODE- 250/636: Performed by: INTERNAL MEDICINE

## 2020-01-01 PROCEDURE — 97164 PT RE-EVAL EST PLAN CARE: CPT

## 2020-01-01 PROCEDURE — 94760 N-INVAS EAR/PLS OXIMETRY 1: CPT

## 2020-01-01 PROCEDURE — 99285 EMERGENCY DEPT VISIT HI MDM: CPT

## 2020-01-01 PROCEDURE — 65660000000 HC RM CCU STEPDOWN

## 2020-01-01 PROCEDURE — 65270000029 HC RM PRIVATE

## 2020-01-01 PROCEDURE — 82607 VITAMIN B-12: CPT

## 2020-01-01 PROCEDURE — 74011250636 HC RX REV CODE- 250/636: Performed by: HOSPITALIST

## 2020-01-01 PROCEDURE — 85025 COMPLETE CBC W/AUTO DIFF WBC: CPT

## 2020-01-01 PROCEDURE — 74011250637 HC RX REV CODE- 250/637: Performed by: FAMILY MEDICINE

## 2020-01-01 PROCEDURE — 74011250637 HC RX REV CODE- 250/637: Performed by: INTERNAL MEDICINE

## 2020-01-01 PROCEDURE — 74011250636 HC RX REV CODE- 250/636: Performed by: FAMILY MEDICINE

## 2020-01-01 PROCEDURE — 74011000250 HC RX REV CODE- 250: Performed by: INTERNAL MEDICINE

## 2020-01-01 PROCEDURE — 2709999900 HC NON-CHARGEABLE SUPPLY

## 2020-01-01 PROCEDURE — 97116 GAIT TRAINING THERAPY: CPT

## 2020-01-01 PROCEDURE — 77030040393 HC DRSG OPTIFOAM GENT MDII -B

## 2020-01-01 PROCEDURE — 97530 THERAPEUTIC ACTIVITIES: CPT

## 2020-01-01 PROCEDURE — 36415 COLL VENOUS BLD VENIPUNCTURE: CPT

## 2020-01-01 PROCEDURE — 80053 COMPREHEN METABOLIC PANEL: CPT

## 2020-01-01 PROCEDURE — 84100 ASSAY OF PHOSPHORUS: CPT

## 2020-01-01 PROCEDURE — 84443 ASSAY THYROID STIM HORMONE: CPT

## 2020-01-01 PROCEDURE — 93005 ELECTROCARDIOGRAM TRACING: CPT | Performed by: EMERGENCY MEDICINE

## 2020-01-01 PROCEDURE — 97535 SELF CARE MNGMENT TRAINING: CPT

## 2020-01-01 PROCEDURE — 86580 TB INTRADERMAL TEST: CPT | Performed by: INTERNAL MEDICINE

## 2020-01-01 PROCEDURE — 74011000250 HC RX REV CODE- 250: Performed by: FAMILY MEDICINE

## 2020-01-01 PROCEDURE — 83605 ASSAY OF LACTIC ACID: CPT

## 2020-01-01 PROCEDURE — 97161 PT EVAL LOW COMPLEX 20 MIN: CPT

## 2020-01-01 PROCEDURE — 71045 X-RAY EXAM CHEST 1 VIEW: CPT

## 2020-01-01 PROCEDURE — 74011250636 HC RX REV CODE- 250/636: Performed by: EMERGENCY MEDICINE

## 2020-01-01 PROCEDURE — 74011000302 HC RX REV CODE- 302: Performed by: INTERNAL MEDICINE

## 2020-01-01 PROCEDURE — 74011250637 HC RX REV CODE- 250/637: Performed by: HOSPITALIST

## 2020-01-01 PROCEDURE — 87635 SARS-COV-2 COVID-19 AMP PRB: CPT

## 2020-01-01 PROCEDURE — 96365 THER/PROPH/DIAG IV INF INIT: CPT

## 2020-01-01 RX ORDER — PANTOPRAZOLE SODIUM 40 MG/1
40 TABLET, DELAYED RELEASE ORAL
Status: DISCONTINUED | OUTPATIENT
Start: 2020-01-01 | End: 2020-01-01 | Stop reason: HOSPADM

## 2020-01-01 RX ORDER — POTASSIUM CHLORIDE 20 MEQ/1
40 TABLET, EXTENDED RELEASE ORAL
Status: ACTIVE | OUTPATIENT
Start: 2020-01-01 | End: 2020-01-01

## 2020-01-01 RX ORDER — DIAZEPAM 2 MG/1
2 TABLET ORAL
Qty: 6 TAB | Refills: 0 | Status: SHIPPED | OUTPATIENT
Start: 2020-01-01 | End: 2020-01-01 | Stop reason: SDUPTHER

## 2020-01-01 RX ORDER — ENOXAPARIN SODIUM 100 MG/ML
40 INJECTION SUBCUTANEOUS DAILY
Status: DISCONTINUED | OUTPATIENT
Start: 2020-01-01 | End: 2020-01-01

## 2020-01-01 RX ORDER — DIPHENHYDRAMINE HCL 25 MG
25 CAPSULE ORAL
Status: DISCONTINUED | OUTPATIENT
Start: 2020-01-01 | End: 2020-01-01 | Stop reason: HOSPADM

## 2020-01-01 RX ORDER — SUCRALFATE 1 G/1
1 TABLET ORAL
Status: DISCONTINUED | OUTPATIENT
Start: 2020-01-01 | End: 2020-01-01 | Stop reason: HOSPADM

## 2020-01-01 RX ORDER — POTASSIUM CHLORIDE 14.9 MG/ML
20 INJECTION INTRAVENOUS ONCE
Status: COMPLETED | OUTPATIENT
Start: 2020-01-01 | End: 2020-01-01

## 2020-01-01 RX ORDER — CHLORHEXIDINE GLUCONATE 1.2 MG/ML
15 RINSE ORAL EVERY 12 HOURS
Qty: 420 ML | Refills: 0 | Status: SHIPPED | OUTPATIENT
Start: 2020-01-01 | End: 2020-01-01

## 2020-01-01 RX ORDER — ACETAMINOPHEN 650 MG/1
650 SUPPOSITORY RECTAL
Status: DISCONTINUED | OUTPATIENT
Start: 2020-01-01 | End: 2020-01-01 | Stop reason: HOSPADM

## 2020-01-01 RX ORDER — MAGNESIUM SULFATE HEPTAHYDRATE 40 MG/ML
2 INJECTION, SOLUTION INTRAVENOUS ONCE
Status: COMPLETED | OUTPATIENT
Start: 2020-01-01 | End: 2020-01-01

## 2020-01-01 RX ORDER — ENOXAPARIN SODIUM 100 MG/ML
30 INJECTION SUBCUTANEOUS DAILY
Status: DISCONTINUED | OUTPATIENT
Start: 2020-01-01 | End: 2020-01-01 | Stop reason: HOSPADM

## 2020-01-01 RX ORDER — GABAPENTIN 300 MG/1
300 CAPSULE ORAL 3 TIMES DAILY
Status: DISCONTINUED | OUTPATIENT
Start: 2020-01-01 | End: 2020-01-01 | Stop reason: HOSPADM

## 2020-01-01 RX ORDER — MAGNESIUM SULFATE HEPTAHYDRATE 40 MG/ML
4 INJECTION, SOLUTION INTRAVENOUS ONCE
Status: COMPLETED | OUTPATIENT
Start: 2020-01-01 | End: 2020-01-01

## 2020-01-01 RX ORDER — POTASSIUM CHLORIDE 20 MEQ/1
40 TABLET, EXTENDED RELEASE ORAL EVERY 4 HOURS
Status: DISCONTINUED | OUTPATIENT
Start: 2020-01-01 | End: 2020-01-01

## 2020-01-01 RX ORDER — SODIUM CHLORIDE 0.9 % (FLUSH) 0.9 %
5-40 SYRINGE (ML) INJECTION EVERY 8 HOURS
Status: DISCONTINUED | OUTPATIENT
Start: 2020-01-01 | End: 2020-01-01 | Stop reason: HOSPADM

## 2020-01-01 RX ORDER — LANOLIN ALCOHOL/MO/W.PET/CERES
800 CREAM (GRAM) TOPICAL 2 TIMES DAILY
Status: DISCONTINUED | OUTPATIENT
Start: 2020-01-01 | End: 2020-01-01 | Stop reason: HOSPADM

## 2020-01-01 RX ORDER — SODIUM CHLORIDE 0.9 % (FLUSH) 0.9 %
5-40 SYRINGE (ML) INJECTION AS NEEDED
Status: DISCONTINUED | OUTPATIENT
Start: 2020-01-01 | End: 2020-01-01 | Stop reason: HOSPADM

## 2020-01-01 RX ORDER — LANOLIN ALCOHOL/MO/W.PET/CERES
400 CREAM (GRAM) TOPICAL 2 TIMES DAILY
Status: DISCONTINUED | OUTPATIENT
Start: 2020-01-01 | End: 2020-01-01

## 2020-01-01 RX ORDER — CHLORHEXIDINE GLUCONATE 1.2 MG/ML
15 RINSE ORAL EVERY 12 HOURS
Status: DISCONTINUED | OUTPATIENT
Start: 2020-01-01 | End: 2020-01-01 | Stop reason: HOSPADM

## 2020-01-01 RX ORDER — DIAZEPAM 2 MG/1
2 TABLET ORAL
Status: DISCONTINUED | OUTPATIENT
Start: 2020-01-01 | End: 2020-01-01 | Stop reason: HOSPADM

## 2020-01-01 RX ORDER — ONDANSETRON 2 MG/ML
4 INJECTION INTRAMUSCULAR; INTRAVENOUS
Status: DISCONTINUED | OUTPATIENT
Start: 2020-01-01 | End: 2020-01-01 | Stop reason: HOSPADM

## 2020-01-01 RX ORDER — POLYETHYLENE GLYCOL 3350 17 G/17G
17 POWDER, FOR SOLUTION ORAL DAILY PRN
Status: DISCONTINUED | OUTPATIENT
Start: 2020-01-01 | End: 2020-01-01 | Stop reason: HOSPADM

## 2020-01-01 RX ORDER — MIRTAZAPINE 15 MG/1
7.5 TABLET, FILM COATED ORAL
Status: DISCONTINUED | OUTPATIENT
Start: 2020-01-01 | End: 2020-01-01

## 2020-01-01 RX ORDER — ACETAMINOPHEN 325 MG/1
650 TABLET ORAL
Status: DISCONTINUED | OUTPATIENT
Start: 2020-01-01 | End: 2020-01-01 | Stop reason: HOSPADM

## 2020-01-01 RX ORDER — TRAMADOL HYDROCHLORIDE 50 MG/1
50 TABLET ORAL
Status: DISCONTINUED | OUTPATIENT
Start: 2020-01-01 | End: 2020-01-01 | Stop reason: HOSPADM

## 2020-01-01 RX ORDER — POLYETHYLENE GLYCOL 3350 17 G/17G
17 POWDER, FOR SOLUTION ORAL
Status: DISCONTINUED | OUTPATIENT
Start: 2020-01-01 | End: 2020-01-01 | Stop reason: HOSPADM

## 2020-01-01 RX ORDER — POTASSIUM CHLORIDE 14.9 MG/ML
20 INJECTION INTRAVENOUS
Status: COMPLETED | OUTPATIENT
Start: 2020-01-01 | End: 2020-01-01

## 2020-01-01 RX ORDER — PROMETHAZINE HYDROCHLORIDE 25 MG/1
12.5 TABLET ORAL
Status: DISCONTINUED | OUTPATIENT
Start: 2020-01-01 | End: 2020-01-01 | Stop reason: HOSPADM

## 2020-01-01 RX ORDER — BUDESONIDE AND FORMOTEROL FUMARATE DIHYDRATE 160; 4.5 UG/1; UG/1
2 AEROSOL RESPIRATORY (INHALATION)
Status: DISCONTINUED | OUTPATIENT
Start: 2020-01-01 | End: 2020-01-01 | Stop reason: HOSPADM

## 2020-01-01 RX ORDER — TRAMADOL HYDROCHLORIDE 50 MG/1
50 TABLET ORAL
Qty: 12 TAB | Refills: 0 | Status: SHIPPED | OUTPATIENT
Start: 2020-01-01 | End: 2020-01-01

## 2020-01-01 RX ORDER — MIRTAZAPINE 15 MG/1
15 TABLET, FILM COATED ORAL
Status: DISCONTINUED | OUTPATIENT
Start: 2020-01-01 | End: 2020-01-01 | Stop reason: HOSPADM

## 2020-01-01 RX ORDER — DEXTROSE MONOHYDRATE, SODIUM CHLORIDE, SODIUM LACTATE, POTASSIUM CHLORIDE, CALCIUM CHLORIDE 5; 600; 310; 179; 20 G/100ML; MG/100ML; MG/100ML; MG/100ML; MG/100ML
INJECTION, SOLUTION INTRAVENOUS CONTINUOUS
Status: DISPENSED | OUTPATIENT
Start: 2020-01-01 | End: 2020-01-01

## 2020-01-01 RX ORDER — LANOLIN ALCOHOL/MO/W.PET/CERES
100 CREAM (GRAM) TOPICAL DAILY
Status: DISCONTINUED | OUTPATIENT
Start: 2020-01-01 | End: 2020-01-01 | Stop reason: HOSPADM

## 2020-01-01 RX ORDER — LANOLIN ALCOHOL/MO/W.PET/CERES
400 CREAM (GRAM) TOPICAL 2 TIMES DAILY
Qty: 60 TAB | Refills: 0 | Status: SHIPPED | OUTPATIENT
Start: 2020-01-01 | End: 2020-01-01 | Stop reason: SDUPTHER

## 2020-01-01 RX ORDER — GABAPENTIN 300 MG/1
300 CAPSULE ORAL 3 TIMES DAILY
Qty: 90 CAP | Refills: 0 | Status: SHIPPED | OUTPATIENT
Start: 2020-01-01 | End: 2020-01-01 | Stop reason: ALTCHOICE

## 2020-01-01 RX ORDER — MIRTAZAPINE 15 MG/1
15 TABLET, FILM COATED ORAL
Qty: 30 TAB | Refills: 0 | Status: SHIPPED | OUTPATIENT
Start: 2020-01-01 | End: 2020-01-01 | Stop reason: SDUPTHER

## 2020-01-01 RX ORDER — POTASSIUM CHLORIDE 20 MEQ/1
40 TABLET, EXTENDED RELEASE ORAL 2 TIMES DAILY
Status: DISCONTINUED | OUTPATIENT
Start: 2020-01-01 | End: 2020-01-01

## 2020-01-01 RX ORDER — UREA 10 %
2 LOTION (ML) TOPICAL 2 TIMES DAILY
Status: DISCONTINUED | OUTPATIENT
Start: 2020-01-01 | End: 2020-01-01 | Stop reason: HOSPADM

## 2020-01-01 RX ORDER — IBUPROFEN 600 MG/1
600 TABLET ORAL
Status: DISCONTINUED | OUTPATIENT
Start: 2020-01-01 | End: 2020-01-01 | Stop reason: HOSPADM

## 2020-01-01 RX ORDER — POTASSIUM CHLORIDE 20 MEQ/1
40 TABLET, EXTENDED RELEASE ORAL DAILY
Qty: 30 TAB | Refills: 0 | Status: SHIPPED
Start: 2020-01-01 | End: 2020-01-01 | Stop reason: SDUPTHER

## 2020-01-01 RX ORDER — LANOLIN ALCOHOL/MO/W.PET/CERES
400 CREAM (GRAM) TOPICAL DAILY
Status: DISCONTINUED | OUTPATIENT
Start: 2020-01-01 | End: 2020-01-01

## 2020-01-01 RX ADMIN — POTASSIUM CHLORIDE 20 MEQ: 200 INJECTION, SOLUTION INTRAVENOUS at 20:32

## 2020-01-01 RX ADMIN — TIOTROPIUM BROMIDE INHALATION SPRAY 2 PUFF: 3.12 SPRAY, METERED RESPIRATORY (INHALATION) at 07:28

## 2020-01-01 RX ADMIN — LACTOBACILLUS TAB 2 TABLET: TAB at 13:11

## 2020-01-01 RX ADMIN — CHLORHEXIDINE GLUCONATE 15 ML: 1.2 RINSE ORAL at 08:10

## 2020-01-01 RX ADMIN — MAGNESIUM SULFATE HEPTAHYDRATE 2 G: 40 INJECTION, SOLUTION INTRAVENOUS at 08:58

## 2020-01-01 RX ADMIN — POTASSIUM CHLORIDE 20 MEQ: 200 INJECTION, SOLUTION INTRAVENOUS at 13:40

## 2020-01-01 RX ADMIN — MIRTAZAPINE 15 MG: 15 TABLET, FILM COATED ORAL at 22:19

## 2020-01-01 RX ADMIN — TUBERCULIN PURIFIED PROTEIN DERIVATIVE 5 UNITS: 5 INJECTION, SOLUTION INTRADERMAL at 11:51

## 2020-01-01 RX ADMIN — SODIUM CHLORIDE 10 ML: 9 INJECTION, SOLUTION INTRAMUSCULAR; INTRAVENOUS; SUBCUTANEOUS at 05:30

## 2020-01-01 RX ADMIN — CHLORHEXIDINE GLUCONATE 15 ML: 1.2 RINSE ORAL at 22:05

## 2020-01-01 RX ADMIN — NYSTATIN 10 ML: 100000 SUSPENSION ORAL at 18:35

## 2020-01-01 RX ADMIN — POTASSIUM CHLORIDE 40 MEQ: 20 TABLET, EXTENDED RELEASE ORAL at 09:00

## 2020-01-01 RX ADMIN — DIPHENHYDRAMINE HYDROCHLORIDE 25 MG: 25 CAPSULE ORAL at 02:07

## 2020-01-01 RX ADMIN — ACETAMINOPHEN 650 MG: 325 TABLET, FILM COATED ORAL at 16:19

## 2020-01-01 RX ADMIN — PANTOPRAZOLE SODIUM 40 MG: 40 TABLET, DELAYED RELEASE ORAL at 05:11

## 2020-01-01 RX ADMIN — IBUPROFEN 600 MG: 600 TABLET ORAL at 23:06

## 2020-01-01 RX ADMIN — GABAPENTIN 300 MG: 300 CAPSULE ORAL at 22:55

## 2020-01-01 RX ADMIN — POTASSIUM CHLORIDE 40 MEQ: 20 TABLET, EXTENDED RELEASE ORAL at 08:46

## 2020-01-01 RX ADMIN — TIOTROPIUM BROMIDE INHALATION SPRAY 2 PUFF: 3.12 SPRAY, METERED RESPIRATORY (INHALATION) at 08:57

## 2020-01-01 RX ADMIN — ENOXAPARIN SODIUM 40 MG: 40 INJECTION SUBCUTANEOUS at 08:59

## 2020-01-01 RX ADMIN — SUCRALFATE 1 G: 1 TABLET ORAL at 22:56

## 2020-01-01 RX ADMIN — SODIUM CHLORIDE 10 ML: 9 INJECTION, SOLUTION INTRAMUSCULAR; INTRAVENOUS; SUBCUTANEOUS at 14:56

## 2020-01-01 RX ADMIN — GABAPENTIN 300 MG: 300 CAPSULE ORAL at 08:48

## 2020-01-01 RX ADMIN — PANTOPRAZOLE SODIUM 40 MG: 40 TABLET, DELAYED RELEASE ORAL at 05:30

## 2020-01-01 RX ADMIN — SODIUM CHLORIDE 10 ML: 9 INJECTION, SOLUTION INTRAMUSCULAR; INTRAVENOUS; SUBCUTANEOUS at 15:46

## 2020-01-01 RX ADMIN — B-COMPLEX W/ C & FOLIC ACID TAB 1 TABLET: TAB at 09:22

## 2020-01-01 RX ADMIN — SODIUM CHLORIDE 10 ML: 9 INJECTION, SOLUTION INTRAMUSCULAR; INTRAVENOUS; SUBCUTANEOUS at 13:26

## 2020-01-01 RX ADMIN — POTASSIUM CHLORIDE 20 MEQ: 200 INJECTION, SOLUTION INTRAVENOUS at 16:18

## 2020-01-01 RX ADMIN — SODIUM CHLORIDE 10 ML: 9 INJECTION, SOLUTION INTRAMUSCULAR; INTRAVENOUS; SUBCUTANEOUS at 21:47

## 2020-01-01 RX ADMIN — B-COMPLEX W/ C & FOLIC ACID TAB 1 TABLET: TAB at 08:42

## 2020-01-01 RX ADMIN — Medication 100 MG: at 09:31

## 2020-01-01 RX ADMIN — CHLORHEXIDINE GLUCONATE 15 ML: 1.2 RINSE ORAL at 09:30

## 2020-01-01 RX ADMIN — GABAPENTIN 300 MG: 300 CAPSULE ORAL at 18:09

## 2020-01-01 RX ADMIN — BUDESONIDE AND FORMOTEROL FUMARATE DIHYDRATE 2 PUFF: 160; 4.5 AEROSOL RESPIRATORY (INHALATION) at 07:07

## 2020-01-01 RX ADMIN — GABAPENTIN 300 MG: 300 CAPSULE ORAL at 16:46

## 2020-01-01 RX ADMIN — Medication 400 MG: at 08:48

## 2020-01-01 RX ADMIN — ENOXAPARIN SODIUM 30 MG: 30 INJECTION SUBCUTANEOUS at 09:21

## 2020-01-01 RX ADMIN — MAGNESIUM SULFATE HEPTAHYDRATE 2 G: 40 INJECTION, SOLUTION INTRAVENOUS at 11:39

## 2020-01-01 RX ADMIN — SUCRALFATE 1 G: 1 TABLET ORAL at 05:24

## 2020-01-01 RX ADMIN — SODIUM CHLORIDE 10 ML: 9 INJECTION, SOLUTION INTRAMUSCULAR; INTRAVENOUS; SUBCUTANEOUS at 05:24

## 2020-01-01 RX ADMIN — TRAMADOL HYDROCHLORIDE 50 MG: 50 TABLET ORAL at 20:24

## 2020-01-01 RX ADMIN — BUDESONIDE AND FORMOTEROL FUMARATE DIHYDRATE 2 PUFF: 160; 4.5 AEROSOL RESPIRATORY (INHALATION) at 07:52

## 2020-01-01 RX ADMIN — DIPHENHYDRAMINE HYDROCHLORIDE 25 MG: 25 CAPSULE ORAL at 00:52

## 2020-01-01 RX ADMIN — SODIUM CHLORIDE 10 ML: 9 INJECTION, SOLUTION INTRAMUSCULAR; INTRAVENOUS; SUBCUTANEOUS at 06:00

## 2020-01-01 RX ADMIN — LACTOBACILLUS TAB 2 TABLET: TAB at 16:47

## 2020-01-01 RX ADMIN — SODIUM CHLORIDE 10 ML: 9 INJECTION, SOLUTION INTRAMUSCULAR; INTRAVENOUS; SUBCUTANEOUS at 05:10

## 2020-01-01 RX ADMIN — CHLORHEXIDINE GLUCONATE 15 ML: 1.2 RINSE ORAL at 22:19

## 2020-01-01 RX ADMIN — POTASSIUM CHLORIDE 40 MEQ: 20 TABLET, EXTENDED RELEASE ORAL at 17:44

## 2020-01-01 RX ADMIN — SODIUM CHLORIDE 10 ML: 9 INJECTION, SOLUTION INTRAMUSCULAR; INTRAVENOUS; SUBCUTANEOUS at 05:11

## 2020-01-01 RX ADMIN — SUCRALFATE 1 G: 1 TABLET ORAL at 13:11

## 2020-01-01 RX ADMIN — SUCRALFATE 1 G: 1 TABLET ORAL at 21:46

## 2020-01-01 RX ADMIN — SUCRALFATE 1 G: 1 TABLET ORAL at 18:09

## 2020-01-01 RX ADMIN — GABAPENTIN 300 MG: 300 CAPSULE ORAL at 08:10

## 2020-01-01 RX ADMIN — MAGNESIUM OXIDE TAB 400 MG (241.3 MG ELEMENTAL MG) 400 MG: 400 (241.3 MG) TAB at 09:00

## 2020-01-01 RX ADMIN — SUCRALFATE 1 G: 1 TABLET ORAL at 05:11

## 2020-01-01 RX ADMIN — Medication 100 MG: at 09:38

## 2020-01-01 RX ADMIN — TIOTROPIUM BROMIDE INHALATION SPRAY 2 PUFF: 3.12 SPRAY, METERED RESPIRATORY (INHALATION) at 08:22

## 2020-01-01 RX ADMIN — BUDESONIDE AND FORMOTEROL FUMARATE DIHYDRATE 2 PUFF: 160; 4.5 AEROSOL RESPIRATORY (INHALATION) at 07:23

## 2020-01-01 RX ADMIN — Medication 100 MG: at 09:30

## 2020-01-01 RX ADMIN — CHLORHEXIDINE GLUCONATE 15 ML: 1.2 RINSE ORAL at 22:56

## 2020-01-01 RX ADMIN — ENOXAPARIN SODIUM 40 MG: 40 INJECTION SUBCUTANEOUS at 09:31

## 2020-01-01 RX ADMIN — Medication 400 MG: at 16:50

## 2020-01-01 RX ADMIN — POTASSIUM CHLORIDE, SODIUM CHLORIDE, CALCIUM CHLORIDE, SODIUM LACTATE, AND DEXTROSE MONOHYDRATE: 1.79; 6; .2; 3.1; 5 INJECTION, SOLUTION INTRAVENOUS at 21:09

## 2020-01-01 RX ADMIN — CHLORHEXIDINE GLUCONATE 15 ML: 1.2 RINSE ORAL at 08:42

## 2020-01-01 RX ADMIN — SODIUM CHLORIDE 10 ML: 9 INJECTION, SOLUTION INTRAMUSCULAR; INTRAVENOUS; SUBCUTANEOUS at 21:00

## 2020-01-01 RX ADMIN — SODIUM PHOSPHATE, MONOBASIC, MONOHYDRATE: 276; 142 INJECTION, SOLUTION INTRAVENOUS at 11:22

## 2020-01-01 RX ADMIN — PANTOPRAZOLE SODIUM 40 MG: 40 TABLET, DELAYED RELEASE ORAL at 06:00

## 2020-01-01 RX ADMIN — Medication 100 MG: at 09:22

## 2020-01-01 RX ADMIN — POTASSIUM BICARBONATE 40 MEQ: 782 TABLET, EFFERVESCENT ORAL at 09:19

## 2020-01-01 RX ADMIN — BUDESONIDE AND FORMOTEROL FUMARATE DIHYDRATE 2 PUFF: 160; 4.5 AEROSOL RESPIRATORY (INHALATION) at 08:00

## 2020-01-01 RX ADMIN — PANTOPRAZOLE SODIUM 40 MG: 40 TABLET, DELAYED RELEASE ORAL at 09:00

## 2020-01-01 RX ADMIN — SODIUM CHLORIDE 10 ML: 9 INJECTION, SOLUTION INTRAMUSCULAR; INTRAVENOUS; SUBCUTANEOUS at 05:19

## 2020-01-01 RX ADMIN — POTASSIUM CHLORIDE 20 MEQ: 200 INJECTION, SOLUTION INTRAVENOUS at 17:34

## 2020-01-01 RX ADMIN — SUCRALFATE 1 G: 1 TABLET ORAL at 11:43

## 2020-01-01 RX ADMIN — IBUPROFEN 600 MG: 600 TABLET ORAL at 13:46

## 2020-01-01 RX ADMIN — POTASSIUM CHLORIDE 20 MEQ: 200 INJECTION, SOLUTION INTRAVENOUS at 02:59

## 2020-01-01 RX ADMIN — SUCRALFATE 1 G: 1 TABLET ORAL at 17:39

## 2020-01-01 RX ADMIN — MAGNESIUM SULFATE HEPTAHYDRATE 4 G: 40 INJECTION, SOLUTION INTRAVENOUS at 15:52

## 2020-01-01 RX ADMIN — POTASSIUM CHLORIDE 20 MEQ: 200 INJECTION, SOLUTION INTRAVENOUS at 18:10

## 2020-01-01 RX ADMIN — BUDESONIDE AND FORMOTEROL FUMARATE DIHYDRATE 2 PUFF: 160; 4.5 AEROSOL RESPIRATORY (INHALATION) at 21:13

## 2020-01-01 RX ADMIN — MAGNESIUM OXIDE TAB 400 MG (241.3 MG ELEMENTAL MG) 400 MG: 400 (241.3 MG) TAB at 09:31

## 2020-01-01 RX ADMIN — LACTOBACILLUS TAB 2 TABLET: TAB at 16:50

## 2020-01-01 RX ADMIN — B-COMPLEX W/ C & FOLIC ACID TAB 1 TABLET: TAB at 08:10

## 2020-01-01 RX ADMIN — IBUPROFEN 600 MG: 600 TABLET ORAL at 05:43

## 2020-01-01 RX ADMIN — CHLORHEXIDINE GLUCONATE 15 ML: 1.2 RINSE ORAL at 09:20

## 2020-01-01 RX ADMIN — TIOTROPIUM BROMIDE INHALATION SPRAY 2 PUFF: 3.12 SPRAY, METERED RESPIRATORY (INHALATION) at 10:18

## 2020-01-01 RX ADMIN — SUCRALFATE 1 G: 1 TABLET ORAL at 16:46

## 2020-01-01 RX ADMIN — MIRTAZAPINE 15 MG: 15 TABLET, FILM COATED ORAL at 21:43

## 2020-01-01 RX ADMIN — BUDESONIDE AND FORMOTEROL FUMARATE DIHYDRATE 2 PUFF: 160; 4.5 AEROSOL RESPIRATORY (INHALATION) at 08:22

## 2020-01-01 RX ADMIN — SODIUM CHLORIDE 10 ML: 9 INJECTION, SOLUTION INTRAMUSCULAR; INTRAVENOUS; SUBCUTANEOUS at 05:04

## 2020-01-01 RX ADMIN — PANTOPRAZOLE SODIUM 40 MG: 40 TABLET, DELAYED RELEASE ORAL at 05:21

## 2020-01-01 RX ADMIN — BUDESONIDE AND FORMOTEROL FUMARATE DIHYDRATE 2 PUFF: 160; 4.5 AEROSOL RESPIRATORY (INHALATION) at 07:28

## 2020-01-01 RX ADMIN — POTASSIUM CHLORIDE 20 MEQ: 200 INJECTION, SOLUTION INTRAVENOUS at 13:11

## 2020-01-01 RX ADMIN — BUDESONIDE AND FORMOTEROL FUMARATE DIHYDRATE 2 PUFF: 160; 4.5 AEROSOL RESPIRATORY (INHALATION) at 19:48

## 2020-01-01 RX ADMIN — LACTOBACILLUS TAB 2 TABLET: TAB at 09:22

## 2020-01-01 RX ADMIN — MIRTAZAPINE 15 MG: 15 TABLET, FILM COATED ORAL at 22:55

## 2020-01-01 RX ADMIN — SODIUM CHLORIDE 10 ML: 9 INJECTION, SOLUTION INTRAMUSCULAR; INTRAVENOUS; SUBCUTANEOUS at 22:20

## 2020-01-01 RX ADMIN — B-COMPLEX W/ C & FOLIC ACID TAB 1 TABLET: TAB at 09:30

## 2020-01-01 RX ADMIN — TIOTROPIUM BROMIDE INHALATION SPRAY 2 PUFF: 3.12 SPRAY, METERED RESPIRATORY (INHALATION) at 08:00

## 2020-01-01 RX ADMIN — Medication 400 MG: at 08:43

## 2020-01-01 RX ADMIN — MAGNESIUM SULFATE HEPTAHYDRATE 4 G: 40 INJECTION, SOLUTION INTRAVENOUS at 11:19

## 2020-01-01 RX ADMIN — SODIUM CHLORIDE 10 ML: 9 INJECTION, SOLUTION INTRAMUSCULAR; INTRAVENOUS; SUBCUTANEOUS at 22:05

## 2020-01-01 RX ADMIN — Medication 400 MG: at 17:39

## 2020-01-01 RX ADMIN — SODIUM CHLORIDE 10 ML: 9 INJECTION, SOLUTION INTRAMUSCULAR; INTRAVENOUS; SUBCUTANEOUS at 21:10

## 2020-01-01 RX ADMIN — BUDESONIDE AND FORMOTEROL FUMARATE DIHYDRATE 2 PUFF: 160; 4.5 AEROSOL RESPIRATORY (INHALATION) at 19:37

## 2020-01-01 RX ADMIN — IBUPROFEN 600 MG: 600 TABLET ORAL at 05:20

## 2020-01-01 RX ADMIN — POLYETHYLENE GLYCOL 3350 17 G: 17 POWDER, FOR SOLUTION ORAL at 17:19

## 2020-01-01 RX ADMIN — MIRTAZAPINE 7.5 MG: 15 TABLET, FILM COATED ORAL at 22:04

## 2020-01-01 RX ADMIN — SUCRALFATE 1 G: 1 TABLET ORAL at 22:19

## 2020-01-01 RX ADMIN — PANTOPRAZOLE SODIUM 40 MG: 40 TABLET, DELAYED RELEASE ORAL at 05:04

## 2020-01-01 RX ADMIN — SUCRALFATE 1 G: 1 TABLET ORAL at 21:43

## 2020-01-01 RX ADMIN — GABAPENTIN 300 MG: 300 CAPSULE ORAL at 16:50

## 2020-01-01 RX ADMIN — LACTOBACILLUS TAB 2 TABLET: TAB at 09:32

## 2020-01-01 RX ADMIN — POTASSIUM CHLORIDE 20 MEQ: 200 INJECTION, SOLUTION INTRAVENOUS at 06:14

## 2020-01-01 RX ADMIN — TIOTROPIUM BROMIDE INHALATION SPRAY 2 PUFF: 3.12 SPRAY, METERED RESPIRATORY (INHALATION) at 07:23

## 2020-01-01 RX ADMIN — ENOXAPARIN SODIUM 40 MG: 40 INJECTION SUBCUTANEOUS at 09:38

## 2020-01-01 RX ADMIN — TIOTROPIUM BROMIDE INHALATION SPRAY 2 PUFF: 3.12 SPRAY, METERED RESPIRATORY (INHALATION) at 07:52

## 2020-01-01 RX ADMIN — LACTOBACILLUS TAB 2 TABLET: TAB at 09:30

## 2020-01-01 RX ADMIN — NYSTATIN 10 ML: 100000 SUSPENSION ORAL at 05:28

## 2020-01-01 RX ADMIN — POTASSIUM CHLORIDE 20 MEQ: 200 INJECTION, SOLUTION INTRAVENOUS at 15:52

## 2020-01-01 RX ADMIN — BUDESONIDE AND FORMOTEROL FUMARATE DIHYDRATE 2 PUFF: 160; 4.5 AEROSOL RESPIRATORY (INHALATION) at 21:38

## 2020-01-01 RX ADMIN — BUDESONIDE AND FORMOTEROL FUMARATE DIHYDRATE 2 PUFF: 160; 4.5 AEROSOL RESPIRATORY (INHALATION) at 10:18

## 2020-01-01 RX ADMIN — GABAPENTIN 300 MG: 300 CAPSULE ORAL at 17:19

## 2020-01-01 RX ADMIN — TIOTROPIUM BROMIDE INHALATION SPRAY 2 PUFF: 3.12 SPRAY, METERED RESPIRATORY (INHALATION) at 07:07

## 2020-01-01 RX ADMIN — ENOXAPARIN SODIUM 30 MG: 30 INJECTION SUBCUTANEOUS at 08:49

## 2020-01-01 RX ADMIN — SODIUM CHLORIDE 10 ML: 9 INJECTION, SOLUTION INTRAMUSCULAR; INTRAVENOUS; SUBCUTANEOUS at 14:00

## 2020-01-01 RX ADMIN — SUCRALFATE 1 G: 1 TABLET ORAL at 05:04

## 2020-01-01 RX ADMIN — MAGNESIUM SULFATE 2 G: 2 INJECTION INTRAVENOUS at 09:19

## 2020-01-01 RX ADMIN — MIRTAZAPINE 15 MG: 15 TABLET, FILM COATED ORAL at 21:46

## 2020-01-01 RX ADMIN — ENOXAPARIN SODIUM 40 MG: 40 INJECTION SUBCUTANEOUS at 09:32

## 2020-01-01 RX ADMIN — Medication 100 MG: at 08:10

## 2020-01-01 RX ADMIN — Medication 100 MG: at 08:48

## 2020-01-01 RX ADMIN — BUDESONIDE AND FORMOTEROL FUMARATE DIHYDRATE 2 PUFF: 160; 4.5 AEROSOL RESPIRATORY (INHALATION) at 19:34

## 2020-01-01 RX ADMIN — BUDESONIDE AND FORMOTEROL FUMARATE DIHYDRATE 2 PUFF: 160; 4.5 AEROSOL RESPIRATORY (INHALATION) at 20:10

## 2020-01-01 RX ADMIN — BUDESONIDE AND FORMOTEROL FUMARATE DIHYDRATE 2 PUFF: 160; 4.5 AEROSOL RESPIRATORY (INHALATION) at 08:57

## 2020-01-01 RX ADMIN — GABAPENTIN 300 MG: 300 CAPSULE ORAL at 22:19

## 2020-01-01 RX ADMIN — LACTOBACILLUS TAB 2 TABLET: TAB at 08:48

## 2020-01-01 RX ADMIN — SODIUM CHLORIDE 10 ML: 9 INJECTION, SOLUTION INTRAMUSCULAR; INTRAVENOUS; SUBCUTANEOUS at 22:56

## 2020-01-01 RX ADMIN — CHLORHEXIDINE GLUCONATE 15 ML: 1.2 RINSE ORAL at 08:49

## 2020-01-01 RX ADMIN — SODIUM PHOSPHATE, MONOBASIC, MONOHYDRATE: 276; 142 INJECTION, SOLUTION INTRAVENOUS at 15:25

## 2020-01-01 RX ADMIN — POTASSIUM CHLORIDE 20 MEQ: 200 INJECTION, SOLUTION INTRAVENOUS at 15:58

## 2020-01-01 RX ADMIN — Medication 400 MG: at 17:19

## 2020-01-01 RX ADMIN — POTASSIUM CHLORIDE 20 MEQ: 200 INJECTION, SOLUTION INTRAVENOUS at 22:10

## 2020-01-01 RX ADMIN — SUCRALFATE 1 G: 1 TABLET ORAL at 12:06

## 2020-01-01 RX ADMIN — Medication 400 MG: at 09:22

## 2020-01-01 RX ADMIN — PANTOPRAZOLE SODIUM 40 MG: 40 TABLET, DELAYED RELEASE ORAL at 08:47

## 2020-01-01 RX ADMIN — POTASSIUM CHLORIDE, SODIUM CHLORIDE, CALCIUM CHLORIDE, SODIUM LACTATE, AND DEXTROSE MONOHYDRATE: 1.79; 6; .2; 3.1; 5 INJECTION, SOLUTION INTRAVENOUS at 08:47

## 2020-01-01 RX ADMIN — GABAPENTIN 300 MG: 300 CAPSULE ORAL at 09:22

## 2020-01-01 RX ADMIN — SUCRALFATE 1 G: 1 TABLET ORAL at 05:30

## 2020-01-01 RX ADMIN — POTASSIUM CHLORIDE 20 MEQ: 200 INJECTION, SOLUTION INTRAVENOUS at 00:33

## 2020-01-01 RX ADMIN — GABAPENTIN 300 MG: 300 CAPSULE ORAL at 21:07

## 2020-01-01 RX ADMIN — LACTOBACILLUS TAB 2 TABLET: TAB at 17:19

## 2020-01-01 RX ADMIN — GABAPENTIN 300 MG: 300 CAPSULE ORAL at 08:43

## 2020-01-01 RX ADMIN — LACTOBACILLUS TAB 2 TABLET: TAB at 08:10

## 2020-01-01 RX ADMIN — CHLORHEXIDINE GLUCONATE 15 ML: 1.2 RINSE ORAL at 21:46

## 2020-01-01 RX ADMIN — POTASSIUM CHLORIDE, SODIUM CHLORIDE, CALCIUM CHLORIDE, SODIUM LACTATE, AND DEXTROSE MONOHYDRATE: 1.79; 6; .2; 3.1; 5 INJECTION, SOLUTION INTRAVENOUS at 23:07

## 2020-01-01 RX ADMIN — MIRTAZAPINE 15 MG: 15 TABLET, FILM COATED ORAL at 21:07

## 2020-01-01 RX ADMIN — Medication 400 MG: at 18:09

## 2020-01-01 RX ADMIN — ENOXAPARIN SODIUM 40 MG: 40 INJECTION SUBCUTANEOUS at 08:46

## 2020-01-01 RX ADMIN — CHLORHEXIDINE GLUCONATE 15 ML: 1.2 RINSE ORAL at 16:18

## 2020-01-01 RX ADMIN — LACTOBACILLUS TAB 2 TABLET: TAB at 17:39

## 2020-01-01 RX ADMIN — BUDESONIDE AND FORMOTEROL FUMARATE DIHYDRATE 2 PUFF: 160; 4.5 AEROSOL RESPIRATORY (INHALATION) at 20:51

## 2020-01-01 RX ADMIN — PANTOPRAZOLE SODIUM 40 MG: 40 TABLET, DELAYED RELEASE ORAL at 05:20

## 2020-01-01 RX ADMIN — B-COMPLEX W/ C & FOLIC ACID TAB 1 TABLET: TAB at 09:31

## 2020-01-01 RX ADMIN — GABAPENTIN 300 MG: 300 CAPSULE ORAL at 21:43

## 2020-01-01 RX ADMIN — SUCRALFATE 1 G: 1 TABLET ORAL at 16:50

## 2020-01-01 RX ADMIN — SODIUM CHLORIDE 10 ML: 9 INJECTION, SOLUTION INTRAMUSCULAR; INTRAVENOUS; SUBCUTANEOUS at 13:12

## 2020-01-01 RX ADMIN — IBUPROFEN 600 MG: 600 TABLET ORAL at 05:30

## 2020-01-01 RX ADMIN — SUCRALFATE 1 G: 1 TABLET ORAL at 12:02

## 2020-01-01 RX ADMIN — ENOXAPARIN SODIUM 30 MG: 30 INJECTION SUBCUTANEOUS at 08:42

## 2020-01-01 RX ADMIN — Medication 400 MG: at 16:47

## 2020-01-01 RX ADMIN — LACTOBACILLUS TAB 2 TABLET: TAB at 16:19

## 2020-01-01 RX ADMIN — Medication 400 MG: at 09:30

## 2020-01-01 RX ADMIN — SUCRALFATE 1 G: 1 TABLET ORAL at 16:19

## 2020-01-01 RX ADMIN — SODIUM CHLORIDE 10 ML: 9 INJECTION, SOLUTION INTRAMUSCULAR; INTRAVENOUS; SUBCUTANEOUS at 13:34

## 2020-01-01 RX ADMIN — SUCRALFATE 1 G: 1 TABLET ORAL at 17:19

## 2020-01-01 RX ADMIN — SUCRALFATE 1 G: 1 TABLET ORAL at 11:20

## 2020-01-01 RX ADMIN — B-COMPLEX W/ C & FOLIC ACID TAB 1 TABLET: TAB at 09:38

## 2020-01-01 RX ADMIN — PANTOPRAZOLE SODIUM 40 MG: 40 TABLET, DELAYED RELEASE ORAL at 05:24

## 2020-01-01 RX ADMIN — SUCRALFATE 1 G: 1 TABLET ORAL at 21:07

## 2020-01-01 RX ADMIN — SODIUM CHLORIDE 10 ML: 9 INJECTION, SOLUTION INTRAMUSCULAR; INTRAVENOUS; SUBCUTANEOUS at 06:15

## 2020-01-01 RX ADMIN — SODIUM CHLORIDE 10 ML: 9 INJECTION, SOLUTION INTRAMUSCULAR; INTRAVENOUS; SUBCUTANEOUS at 13:31

## 2020-01-01 RX ADMIN — POTASSIUM CHLORIDE, SODIUM CHLORIDE, CALCIUM CHLORIDE, SODIUM LACTATE, AND DEXTROSE MONOHYDRATE: 1.79; 6; .2; 3.1; 5 INJECTION, SOLUTION INTRAVENOUS at 09:06

## 2020-01-01 RX ADMIN — SUCRALFATE 1 G: 1 TABLET ORAL at 22:04

## 2020-01-01 RX ADMIN — LACTOBACILLUS TAB 2 TABLET: TAB at 08:43

## 2020-01-01 RX ADMIN — SUCRALFATE 1 G: 1 TABLET ORAL at 11:22

## 2020-01-01 RX ADMIN — CHLORHEXIDINE GLUCONATE 15 ML: 1.2 RINSE ORAL at 21:07

## 2020-01-01 RX ADMIN — SODIUM CHLORIDE 10 ML: 9 INJECTION, SOLUTION INTRAMUSCULAR; INTRAVENOUS; SUBCUTANEOUS at 13:40

## 2020-01-01 RX ADMIN — PANTOPRAZOLE SODIUM 40 MG: 40 TABLET, DELAYED RELEASE ORAL at 05:22

## 2020-01-01 RX ADMIN — CHLORHEXIDINE GLUCONATE 15 ML: 1.2 RINSE ORAL at 21:43

## 2020-01-01 RX ADMIN — ENOXAPARIN SODIUM 40 MG: 40 INJECTION SUBCUTANEOUS at 08:09

## 2020-01-01 RX ADMIN — SUCRALFATE 1 G: 1 TABLET ORAL at 11:52

## 2020-01-01 RX ADMIN — Medication 400 MG: at 08:10

## 2020-01-01 RX ADMIN — NYSTATIN 10 ML: 100000 SUSPENSION ORAL at 12:00

## 2020-01-01 RX ADMIN — SODIUM CHLORIDE 1000 ML: 900 INJECTION, SOLUTION INTRAVENOUS at 00:48

## 2020-01-01 RX ADMIN — LACTOBACILLUS TAB 2 TABLET: TAB at 18:09

## 2020-01-01 RX ADMIN — Medication 100 MG: at 08:43

## 2020-01-01 RX ADMIN — B-COMPLEX W/ C & FOLIC ACID TAB 1 TABLET: TAB at 08:48

## 2020-01-01 RX ADMIN — SUCRALFATE 1 G: 1 TABLET ORAL at 06:00

## 2020-01-01 RX ADMIN — SODIUM CHLORIDE 10 ML: 9 INJECTION, SOLUTION INTRAMUSCULAR; INTRAVENOUS; SUBCUTANEOUS at 21:08

## 2020-01-01 RX ADMIN — MAGNESIUM OXIDE TAB 400 MG (241.3 MG ELEMENTAL MG) 400 MG: 400 (241.3 MG) TAB at 09:38

## 2020-01-01 RX ADMIN — TRAMADOL HYDROCHLORIDE 50 MG: 50 TABLET ORAL at 22:19

## 2020-01-15 PROBLEM — R63.4 WEIGHT LOSS: Status: ACTIVE | Noted: 2020-01-15

## 2020-01-15 PROBLEM — I25.84 CORONARY ARTERY CALCIFICATION: Status: ACTIVE | Noted: 2020-01-15

## 2020-01-15 PROBLEM — I25.10 CORONARY ARTERY CALCIFICATION: Status: ACTIVE | Noted: 2020-01-15

## 2020-01-15 PROBLEM — R74.8 ELEVATED SERUM ALKALINE PHOSPHATASE LEVEL: Status: ACTIVE | Noted: 2020-01-15

## 2020-05-14 NOTE — PROGRESS NOTES
Please call patient with lab results. New pulm nodule-- check in 6 mos. Localized Dermabrasion Text: The patient was draped in routine manner.  Localized dermabrasion using 3 x 17 mm wire brush was performed in routine manner to papillary dermis. This spot dermabrasion is being performed to complete skin cancer reconstruction. It also will eliminate the other sun damaged precancerous cells that are known to be part of the regional effect of a lifetime's worth of sun exposure. This localized dermabrasion is therapeutic and should not be considered cosmetic in any regard. Localized Dermabrasion With Wire Brush Text: The patient was draped in routine manner.  Localized dermabrasion using 3 x 17 mm wire brush was performed in routine manner to papillary dermis. This spot dermabrasion is being performed to complete skin cancer reconstruction. It also will eliminate the other sun damaged precancerous cells that are known to be part of the regional effect of a lifetime's worth of sun exposure. This localized dermabrasion is therapeutic and should not be considered cosmetic in any regard.

## 2020-10-28 PROBLEM — R64 CACHEXIA (HCC): Status: ACTIVE | Noted: 2020-01-01

## 2020-10-28 PROBLEM — E87.6 HYPOKALEMIA: Status: ACTIVE | Noted: 2020-01-01

## 2020-10-28 PROBLEM — E43 SEVERE PROTEIN-CALORIE MALNUTRITION (HCC): Status: ACTIVE | Noted: 2020-01-01

## 2020-10-28 PROBLEM — E43 SEVERE PROTEIN-CALORIE MALNUTRITION (GOMEZ: LESS THAN 60% OF STANDARD WEIGHT) (HCC): Status: ACTIVE | Noted: 2020-01-01

## 2020-10-28 NOTE — H&P
HOSPITALIST H&P/CONSULT 
NAME:  Angel Matute Age:  61 y.o. 
:   1960 MRN:   [id number] PCP: Aracely Wood MD 
Consulting MD: 
 
HPI:  
Patient is a 65yo M with a history of COPD, HCV, HLD, tobacco abuse, and alcohol abuse who presents with weakness and decreased intake for three weeks. He was made to come in by his son. He has been in generally poor health for years but acute decline in the last month. He quit smoking and drinking (former 12 beers most days) in the same timeline as well. Did not have obvious withdrawal symptoms. Has been drinking water and sometimes juice, hasn't eaten any solid food in three weeks. No appetite, some vague abdominal pain, not currently. Was previously thin, obviously losing weight quickly now. Found in the ER to have severe hypokalemia. Prior workup for weight loss included CT chest abdomen pelvis 19 without obvious malignancy, with stable pulm nodules, gastric wall thickening concerning for gastritis. Had planned EGD and colonoscopy, but refused. Is past due for repeat CT chest cancer screening. Complete ROS done and is as stated in HPI or otherwise negative Past Medical History:  
Diagnosis Date  COPD (chronic obstructive pulmonary disease) (HCC)  Hepatitis C   
 Hepatitis C antibody test positive 10/12/2016  Hyperlipidemia  Hypertension  Influenza  Pulmonary nodule  Tobacco abuse 2015 No past surgical history on file. reviewed Prior to Admission Medications Prescriptions Last Dose Informant Patient Reported? Taking? albuterol (VENTOLIN HFA) 90 mcg/actuation inhaler   No No  
Sig: Take 2 Puffs by inhalation every four (4) hours as needed for Wheezing. budesonide-formoteroL (SYMBICORT) 160-4.5 mcg/actuation HFAA   No No  
Sig: Take 2 Puffs by inhalation two (2) times a day. diazePAM (VALIUM) 2 mg tablet   No No  
Sig: Take 1 Tab by mouth every twelve (12) hours as needed for Anxiety. Max Daily Amount: 4 mg.  
gabapentin (NEURONTIN) 300 mg capsule   No No  
Sig: TAKE 1 CAP BY MOUTH NIGHTLY. TAKE 2 HRS BEFORE BEDTIME. omeprazole (PRILOSEC) 40 mg capsule   No No  
Sig: Take 1 Cap by mouth daily. umeclidinium (INCRUSE ELLIPTA) 62.5 mcg/actuation inhaler   No No  
Sig: Take 1 Puff by inhalation daily. Facility-Administered Medications: None Allergies Allergen Reactions  Bees [Hymenoptera Allergenic Extract] Swelling Social History Tobacco Use  Smoking status: Current Every Day Smoker Packs/day: 1.00 Years: 35.00 Pack years: 35.00 Types: Cigarettes Start date: 1983 Substance Use Topics  Alcohol use: Yes Alcohol/week: 35.0 - 70.0 standard drinks Types: 42 - 84 Standard drinks or equivalent per week Family History Problem Relation Age of Onset  COPD Mother   
     Stage 3  
 Cancer Father Prostate  Diabetes Father  Heart Disease Father  Cancer Sister Breast  
 Other Sister Atrial Fibrillation  Other Brother Coronary artery bypass surgery  Alzheimer Maternal Grandmother  Dementia Maternal Grandmother  Other Maternal Grandfather Old age  Allergy-severe Paternal Grandmother  Cancer Paternal Grandfather Prostate  Diabetes Paternal Grandfather   
 reviewed Objective:  
 
Visit Vitals /78 Pulse 69 Temp 97.8 °F (36.6 °C) Resp 17 Ht 6' (1.829 m) Wt 45.4 kg (100 lb) SpO2 98% BMI 13.56 kg/m² Temp (24hrs), Av.8 °F (36.6 °C), Min:97.8 °F (36.6 °C), Max:97.8 °F (36.6 °C) Oxygen Therapy O2 Sat (%): 98 % (10/28/20 0258) Pulse via Oximetry: 70 beats per minute (10/28/20 0258) O2 Device: Room air (10/28/20 0258) Physical Exam: 
General:    Cachectic, NAD. Head:   Temporal wassting Nose:  Nares normal. No drainage or sinus tenderness. Lungs:   Clear to auscultation bilaterally. No Wheezing or Rhonchi. No rales. Heart:   Regular rate and rhythm,  no murmur, rub or gallop. Abdomen:   Soft, non-tender. Not distended. Bowel sounds normal.  
Extremities: No cyanosis. No edema. No clubbing Skin:     Texture, turgor normal. No rashes or lesions. Not Jaundiced Neurologic: Alert and oriented x 3, no focal deficits Data Review:  
Recent Results (from the past 24 hour(s)) EKG, 12 LEAD, INITIAL Collection Time: 10/27/20 10:17 PM  
Result Value Ref Range Ventricular Rate 85 BPM  
 Atrial Rate 85 BPM  
 P-R Interval 140 ms QRS Duration 86 ms  
 Q-T Interval 346 ms  
 QTC Calculation (Bezet) 411 ms Calculated P Axis 85 degrees Calculated R Axis 100 degrees Calculated T Axis 81 degrees Diagnosis    
  !! AGE AND GENDER SPECIFIC ECG ANALYSIS !! Sinus rhythm with Possible Premature atrial complexes with Aberrant  
conduction Biatrial enlargement Rightward axis Anterior infarct , age undetermined Abnormal ECG When compared with ECG of 09-FEB-2017 10:15, Aberrant conduction is now Present Vent. rate has decreased BY  49 BPM 
ST now depressed in Inferior leads LACTIC ACID Collection Time: 10/27/20 10:28 PM  
Result Value Ref Range Lactic acid 2.1 (H) 0.4 - 2.0 MMOL/L  
CBC WITH AUTOMATED DIFF Collection Time: 10/27/20 11:21 PM  
Result Value Ref Range WBC 13.5 (H) 4.3 - 11.1 K/uL  
 RBC 4.55 4.23 - 5.6 M/uL  
 HGB 15.4 13.6 - 17.2 g/dL HCT 41.2 41.1 - 50.3 % MCV 90.5 79.6 - 97.8 FL  
 MCH 33.8 (H) 26.1 - 32.9 PG  
 MCHC 37.4 (H) 31.4 - 35.0 g/dL  
 RDW 14.1 11.9 - 14.6 % PLATELET 669 785 - 451 K/uL MPV 12.1 9.4 - 12.3 FL ABSOLUTE NRBC 0.05 0.0 - 0.2 K/uL  
 DF AUTOMATED NEUTROPHILS 88 (H) 43 - 78 % LYMPHOCYTES 6 (L) 13 - 44 % MONOCYTES 4 4.0 - 12.0 % EOSINOPHILS 1 0.5 - 7.8 % BASOPHILS 0 0.0 - 2.0 % IMMATURE GRANULOCYTES 1 0.0 - 5.0 %  
 ABS. NEUTROPHILS 11.9 (H) 1.7 - 8.2 K/UL  
 ABS. LYMPHOCYTES 0.8 0.5 - 4.6 K/UL  
 ABS. MONOCYTES 0.5 0.1 - 1.3 K/UL ABS. EOSINOPHILS 0.1 0.0 - 0.8 K/UL  
 ABS. BASOPHILS 0.0 0.0 - 0.2 K/UL  
 ABS. IMM. GRANS. 0.2 0.0 - 0.5 K/UL METABOLIC PANEL, COMPREHENSIVE Collection Time: 10/27/20 11:21 PM  
Result Value Ref Range Sodium 125 (L) 138 - 145 mmol/L Potassium 6.0 (H) 3.5 - 5.1 mmol/L Chloride 94 (L) 98 - 107 mmol/L  
 CO2 17 (L) 21 - 32 mmol/L Anion gap 14 7 - 16 mmol/L Glucose 100 65 - 100 mg/dL BUN 78 (H) 8 - 23 MG/DL Creatinine 1.28 0.8 - 1.5 MG/DL  
 GFR est AA >60 >60 ml/min/1.73m2 GFR est non-AA >60 >60 ml/min/1.73m2 Calcium 9.0 8.3 - 10.4 MG/DL Bilirubin, total 1.5 (H) 0.2 - 1.1 MG/DL  
 ALT (SGPT) 31 12 - 65 U/L  
 AST (SGOT) 101 (H) 15 - 37 U/L Alk. phosphatase 118 50 - 136 U/L Protein, total 7.9 6.3 - 8.2 g/dL Albumin 2.4 (L) 3.2 - 4.6 g/dL Globulin 5.5 (H) 2.3 - 3.5 g/dL A-G Ratio 0.4 (L) 1.2 - 3.5 MAGNESIUM Collection Time: 10/27/20 11:21 PM  
Result Value Ref Range Magnesium 2.5 (H) 1.8 - 2.4 mg/dL METABOLIC PANEL, COMPREHENSIVE Collection Time: 10/28/20  1:06 AM  
Result Value Ref Range Sodium 130 (L) 138 - 145 mmol/L Potassium 1.6 (LL) 3.5 - 5.1 mmol/L Chloride 96 (L) 98 - 107 mmol/L  
 CO2 19 (L) 21 - 32 mmol/L Anion gap 15 7 - 16 mmol/L Glucose 104 (H) 65 - 100 mg/dL BUN 79 (H) 8 - 23 MG/DL Creatinine 1.19 0.8 - 1.5 MG/DL  
 GFR est AA >60 >60 ml/min/1.73m2 GFR est non-AA >60 >60 ml/min/1.73m2 Calcium 8.9 8.3 - 10.4 MG/DL Bilirubin, total 1.3 (H) 0.2 - 1.1 MG/DL  
 ALT (SGPT) 24 12 - 65 U/L  
 AST (SGOT) 27 15 - 37 U/L Alk. phosphatase 108 50 - 136 U/L Protein, total 6.9 6.3 - 8.2 g/dL Albumin 2.6 (L) 3.2 - 4.6 g/dL Globulin 4.3 (H) 2.3 - 3.5 g/dL A-G Ratio 0.6 (L) 1.2 - 3.5 POTASSIUM Collection Time: 10/28/20  1:59 AM  
Result Value Ref Range Potassium 1.8 (LL) 3.5 - 5.1 mmol/L MAGNESIUM Collection Time: 10/28/20  1:59 AM  
Result Value Ref Range Magnesium 2.1 1.8 - 2.4 mg/dL Imaging [facility] XR CHEST PORT Final Result IMPRESSION:   
1. No acute process. 2. Emphysema. Assessment and Plan: Active Hospital Problems Diagnosis Date Noted  Cachexia ([facility] [address] 10/28/2020  Severe protein-calorie malnutrition ([facility] 10/28/2020  Hypokalemia 10/28/2020  Weight loss 01/15/2020  COPD (chronic obstructive pulmonary disease) ([facility] [address] 04/09/2015  Hypertension 04/09/2015 PLAN: 
Hypok: 
Replace K PO and IV, trend BMP Weight loss, abdominal pain: 
Chronic pulmonary cachexia present, with potential some new additional etiology over the last month possibly related to GI. PPI Resistant to further invasive workup at this time. May be convinced by his son but otherwise may benefit most from palliative or hospice consult. Malnutrition, weakness Nutrition consult PT and OT consults Home COPD meds. Not in exacerbation DVT PPx: lovenox Code Status: DNR, confirmed with son who is surrogate Anticipated discharge: 2-3 days Signed By: Arvind Bennett MD   
 October 28, 2020

## 2020-10-28 NOTE — ED NOTES
TRANSFER - OUT REPORT: 
 
Verbal report given to Naina(name) on Torey Tam  being transferred to 606(unit) for routine progression of care Report consisted of patients Situation, Background, Assessment and  
Recommendations(SBAR). Information from the following report(s) ED Summary was reviewed with the receiving nurse. Lines:  
Peripheral IV 10/27/20 Left Antecubital (Active) Site Assessment Clean, dry, & intact 10/27/20 2226 Phlebitis Assessment 0 10/27/20 2226 Infiltration Assessment 0 10/27/20 2226 Dressing Status Clean, dry, & intact 10/27/20 2226 Dressing Type Transparent 10/27/20 2226 Hub Color/Line Status Pink 10/27/20 2226 Peripheral IV 10/28/20 Right Antecubital (Active) Site Assessment Clean, dry, & intact 10/28/20 9323 Phlebitis Assessment 0 10/28/20 7910 Infiltration Assessment 0 10/28/20 5324 Dressing Status Clean, dry, & intact 10/28/20 3855 Opportunity for questions and clarification was provided. Patient transported with: 
 [employer]

## 2020-10-28 NOTE — PROGRESS NOTES
10/28/20 1553 Skin Integumentary Skin Integumentary (WDL) X Skin Color Ecchymosis (comment) Skin Condition/Temp Dry;Flaky; Warm  
Skin Integrity Excoriation Turgor Non-tenting Hair Growth Present Varicosities Absent Pressure  Injury Documentation No Pressure Injury Noted-Pressure Ulcer Prevention Initiated Primary Nurse Sharlene Medina RN and Bettie Hughes RN performed a dual skin assessment on this patient No impairment noted. David score is 14

## 2020-10-28 NOTE — ED TRIAGE NOTES
Pt arrived via [facility] from home c/o failure to thrive, bilateral knee pain, decreased appetite and AMS. Per the family, pt has not eaten in 3 weeks, however he is drinking. Pt has not been compliant with meds. Pt is not ambulatory. Pt appears weak and oriented at time of triage

## 2020-10-28 NOTE — PROGRESS NOTES
Problem: Mobility Impaired (Adult and Pediatric) Goal: *Therapy Goal (Edit Goal, Insert Text) Description: LTG: 
(1.)Mr. Rebekah Wiley will move from supine to sit and sit to supine , scoot up and down, and roll side to side in bed with INDEPENDENT within 7 treatment day(s). (2.)Mr. Rebekah Wiley will transfer from bed to chair and chair to bed with MODIFIED INDEPENDENCE using the least restrictive device within 7 treatment day(s). (3.)Mr. Rebekah Wiley will ambulate with MODIFIED INDEPENDENCE for 50+ feet with the least restrictive device within 7 treatment day(s). ________________________________________________________________________________________________ Outcome: Progressing Towards Goal 
  
PHYSICAL THERAPY: Initial Assessment and Daily Note 10/28/2020 INPATIENT: PT Visit Days : 1 Payor: [facility] MED[facility] / [place] [facility] MEDICARE HMO/PPO / Product Type: Managed Care Medicare /   
  
NAME/AGE/GENDER: Queta Sarah is a 61 y.o. male PRIMARY DIAGNOSIS: Hypokalemia [E87.6] Severe protein-calorie malnutrition Matt Yessenia: less than 60% of standard weight) ([facility] [address] Severe protein-calorie malnutrition Matt Yessenia: less than 60% of standard weight) ([facility] [address] ICD-10: Treatment Diagnosis:  
 Other abnormalities of gait and mobility (R26.89) Precaution/Allergies: 
Bees [employer] allergenic extract] ASSESSMENT:  
 
Mr. Rebekah Wiley presents supine in bed in ED, legs hanging off end of stretcher. He appears disheveled and cachectic. States he lives with his son. Patient states that he has been unable to ambulate for 3 weeks, so has not left the couch in 3 weeks. Patient initially resistant to moving, but with strong encouragement agreed to sit up. Patient sat up with CGA. Patient was then able to scoot himself up towards the [facility] with CGA. He refused to attempt standing, stating \"I don't want to fall! \".   Reassured patient several times that I would not let him fall, but he continued to refuse to stand. Back to supine with CGA. Patient has declined in functional mobility over the past weeks. Mr. Lizzie Andrew would benefit from skilled physical therapy (medically necessary) to address his deficits and maximize his function. If he continues to refuse to fully participate, we will discontinue skilled PT. This section established at most recent assessment PROBLEM LIST (Impairments causing functional limitations): 
Decreased ADL/Functional Activities Decreased Transfer Abilities Decreased Ambulation Ability/Technique Decreased Balance Increased Pain Decreased Activity Tolerance INTERVENTIONS PLANNED: (Benefits and precautions of physical therapy have been discussed with the patient.) Balance Exercise Bed Mobility Gait Training Therapeutic Activites Therapeutic Exercise/Strengthening Transfer Training 
education TREATMENT PLAN: Frequency/Duration: 3 times a week for duration of hospital stay Rehabilitation Potential For Stated Goals: Fair REHAB RECOMMENDATIONS (at time of discharge pending progress):   
Placement: It is my opinion, based on this patient's performance to date, that Mr. Lizzie Andrew may benefit from participating in 1-2 additional therapy sessions in order to continue to assess for rehab potential and then make recommendation for disposition at discharge. Equipment:  
Tbd----RW? ? HISTORY:  
History of Present Injury/Illness (Reason for Referral): 
Per MD note, \"Patient is a 63yo M with a history of COPD, HCV, HLD, tobacco abuse, and alcohol abuse who presents with weakness and decreased intake for three weeks. He was made to come in by his son. He has been in generally poor health for years but acute decline in the last month. He quit smoking and drinking (former 12 beers most days) in the same timeline as well. Did not have obvious withdrawal symptoms.  
Has been drinking water and sometimes juice, hasn't eaten any solid food in three weeks. No appetite, some vague abdominal pain, not currently. Was previously thin, obviously losing weight quickly now. Found in the ER to have severe hypokalemia. Prior workup for weight loss included CT chest abdomen pelvis 6/5/19 without obvious malignancy, with stable pulm nodules, gastric wall thickening concerning for gastritis. Had planned EGD and colonoscopy, but refused. Is past due for repeat CT chest cancer screening. \" 
Past Medical History/Comorbidities:  
Mr. Nithya Parks  has a past medical history of COPD (chronic obstructive pulmonary disease) ([facility] [address] Hepatitis C, Hepatitis C antibody test positive (10/12/2016), Hyperlipidemia, Hypertension, Influenza, Pulmonary nodule, and Tobacco abuse (6/24/2015). Mr. Nithya Parks  has no past surgical history on file. Social History/Living Environment:  
Home Environment: Private residence Living Alone: No 
Support Systems: Child(walter) Prior Level of Function/Work/Activity: 
States he lives with his son. Patient states that he has been unable to ambulate for 3 weeks, so has not left the couch in 3 weeks. Number of Personal Factors/Comorbidities that affect the Plan of Care: 1-2: MODERATE COMPLEXITY EXAMINATION:  
Most Recent Physical Functioning:  
Gross Assessment: 
AROM: Generally decreased, functional 
Strength: Generally decreased, functional 
         
  
Posture: 
Posture (WDL): Exceptions to [facility] Posture Assessment: Forward head, Rounded shoulders Balance: 
Sitting: Impaired Sitting - Static: Prop sitting Sitting - Dynamic: Prop sitting Standing: Impaired Standing - Static: (refused) Bed Mobility: 
Supine to Sit: Contact guard assistance Sit to Supine: Contact guard assistance Scooting: Contact guard assistance Wheelchair Mobility: 
  
Transfers: 
Sit to Stand: (refused) Gait: refused Body Structures Involved: Metabolic Muscles Body Functions Affected: 
Sensory/Pain Neuromusculoskeletal 
 Movement Related Activities and Participation Affected: Mobility Self Care Domestic Life Number of elements that affect the Plan of Care: 4+: HIGH COMPLEXITY CLINICAL PRESENTATION:  
Presentation: Evolving clinical presentation with changing clinical characteristics: MODERATE COMPLEXITY CLINICAL DECISION MAKIN \[facility] [place] [facility]\"" [address] [address] AM-PAC 6 Clicks Basic Mobility Inpatient Short Form How much difficulty does the patient currently have. .. Unable A Lot A Little None 1. Turning over in bed (including adjusting bedclothes, sheets and blankets)? [] 1   [] 2   [x] 3   [] 4  
2. Sitting down on and standing up from a chair with arms ( e.g., wheelchair, bedside commode, etc.)   [] 1   [x] 2   [] 3   [] 4  
3. Moving from lying on back to sitting on the side of the bed? [] 1   [] 2   [x] 3   [] 4 How much help from another person does the patient currently need. .. Total A Lot A Little None 4. Moving to and from a bed to a chair (including a wheelchair)? [] 1   [x] 2   [] 3   [] 4  
5. Need to walk in hospital room? [] 1   [x] 2   [] 3   [] 4  
6. Climbing 3-5 steps with a railing? [] 1   [x] 2   [] 3   [] 4  
© , Trustees of [address] [place] [address] [address] [address], under license to [employer]. All rights reserved Score:  Initial: 14 Most Recent: X (Date: -- ) Interpretation of Tool:  Represents activities that are increasingly more difficult (i.e. Bed mobility, Transfers, Gait). Medical Necessity:    
Patient is expected to demonstrate progress in  
strength, balance, and functional technique 
 to  
increase independence with   and improve safety during all functional mobility Osie Ra Reason for Services/Other Comments: 
Patient continues to require skilled intervention due to  
medical complications and patient unable to attend/participate in therapy as expected Osie Ra Use of outcome tool(s) and clinical judgement create a POC that gives a: Questionable prediction of patient's progress: MODERATE COMPLEXITY  
  
 
 
 
TREATMENT:  
(In addition to Assessment/Re-Assessment sessions the following treatments were rendered) Pre-treatment Symptoms/Complaints:  wanting ice Pain: Initial:  
Pain Intensity 1: 8 Pain Location 1: Leg 
Pain Orientation 1: Left, Right  Post Session:  8 Assessment/Reassessment only, no treatment provided today Braces/Orthotics/Lines/Etc:  
O2 Device: Room air Treatment/Session Assessment:   
Response to Treatment:  see above. Interdisciplinary Collaboration:  
Physical Therapist 
Registered Nurse After treatment position/precautions:  
Supine in bed Bed/Chair-wheels locked RN notified Compliance with Program/Exercises: Will assess as treatment progresses Recommendations/Intent for next treatment session: \"Next visit will focus on advancements to more challenging activities and reduction in assistance provided\". Total Treatment Duration: PT Patient Time In/Time Out Time In: 2079 Time Out: 1326 A Luigi Rodríguez, PT, DPT

## 2020-10-28 NOTE — PROGRESS NOTES
Pt is a 61years old male with hx of COPD, HCV, dyslipidemia, tobacco abuse, alcohol abuse, admitted on 10/28 for failure to thrive in view of poor oral intake, weight loss and cachexia. Pt has been noncompliant with follow ups and medications. He was also found to have severely hypokalemia. Pt is DNR/DNI. He is on potassium supplement along with symbicort and spiriva. He has severe protein calorie malnutrition, will call nutrition consult for eval. 
I agree with rest of the assessment and plan documented by Dr. Gentry Valdez.

## 2020-10-28 NOTE — ED NOTES
Pt reports Hx of COPD and emphysema. Pt denies blood thinners. Pt states he hasn't been eating \"because I don't feel like it. \" Pt reports he fell 1 week ago. Pt only c/o bilateral knee pain but is unable to elaborate any further on the complaint.

## 2020-10-28 NOTE — ED PROVIDER NOTES
[facility]  
 
 
HPI:   
58-year-old male history of COPD, hepatitis C, tobacco abuse, hypertension presents to the emergency department with family members with complaint of essentially failure to thrive. Son reports the patient has not eaten anything in weeks. Intermittent bowel movements. He has had generalized weakness to the point that his legs are giving out any can't really ambulate. He fell last week but didn't have any LOC or head trauma. Patient is quite thin and cachectic but reportedly does not have any known history of malignancy. Patient does follow-up with [facility], Most recently in January. He had been scheduled get an EGD and colonoscopy due to weight last but did not do so because he was scared to get this performed. Son reports he quit smoking a month ago along with quitting alcohol about 2 months ago. REVIEW OF SYSTEMS  
 
ROS Negative Except as Listed in HPI 
 
PAST MEDICAL HISTORY Past Medical History:  
Diagnosis Date  COPD (chronic obstructive pulmonary disease) (HCC)  Hepatitis C   
 Hepatitis C antibody test positive 10/12/2016  Hyperlipidemia  Hypertension  Influenza  Pulmonary nodule  Tobacco abuse 6/24/2015 No past surgical history on file. Social History Socioeconomic History  Marital status: LEGALLY  Spouse name: Not on file  Number of children: Not on file  Years of education: Not on file  Highest education level: Not on file Tobacco Use  Smoking status: Current Every Day Smoker Packs/day: 1.00 Years: 35.00 Pack years: 35.00 Types: Cigarettes Start date: 4/9/1983 Substance and Sexual Activity  Alcohol use: Yes Alcohol/week: 35.0 - 70.0 standard drinks Types: 42 - 84 Standard drinks or equivalent per week  Drug use: No  
 
Prior to Admission Medications Prescriptions Last Dose Informant Patient Reported? Taking? albuterol (VENTOLIN HFA) 90 mcg/actuation inhaler   No No  
Sig: Take 2 Puffs by inhalation every four (4) hours as needed for Wheezing. budesonide-formoteroL (SYMBICORT) 160-4.5 mcg/actuation HFAA   No No  
Sig: Take 2 Puffs by inhalation two (2) times a day. diazePAM (VALIUM) 2 mg tablet   No No  
Sig: Take 1 Tab by mouth every twelve (12) hours as needed for Anxiety. Max Daily Amount: 4 mg.  
gabapentin (NEURONTIN) 300 mg capsule   No No  
Sig: TAKE 1 CAP BY MOUTH NIGHTLY. TAKE 2 HRS BEFORE BEDTIME. omeprazole (PRILOSEC) 40 mg capsule   No No  
Sig: Take 1 Cap by mouth daily. umeclidinium (INCRUSE ELLIPTA) 62.5 mcg/actuation inhaler   No No  
Sig: Take 1 Puff by inhalation daily. Facility-Administered Medications: None Allergies Allergen Reactions  Bees [[employer] Allergenic Extract] Swelling PHYSICAL EXAM    
 
Vitals:  
 10/27/20 2210 10/27/20 2211 BP: 104/70 104/70 Pulse: 87 85 Resp:  16 Temp:  97.8 °F (36.6 °C) SpO2: 96% 96% Vital signs were reviewed. Physical Exam 
Vitals signs and nursing note reviewed. Constitutional:   
   Comments: Thin cachectic chronically ill-appearing. Somewhat disheveled. HENT:  
   Head: Atraumatic. Mouth/Throat:  
   Mouth: Mucous membranes are dry. Eyes:  
   General: No scleral icterus. Neck: Musculoskeletal: Neck supple. No muscular tenderness. Cardiovascular:  
   Rate and Rhythm: Normal rate and regular rhythm. Pulmonary:  
   Effort: Pulmonary effort is normal.  
   Comments: Scattered expiratory wheezes Abdominal:  
   Palpations: Abdomen is soft. Tenderness: There is no abdominal tenderness. Musculoskeletal:  
   Comments: Thin cachectic limbs. No obvious deformities or bony tenderness Skin: 
   General: Skin is warm and dry. Neurological:  
   Comments: Awake alert. GCS 15. No facial droop. No dysarthria.  Moves all extremities well. Psychiatric:     
   Behavior: Behavior normal.  
 
  
 
MEDICAL DECISION MAKING  
 
 
 
 
 
EKG performed at 2217. Baseline wander in V2 limiting interpretation. Sinus rhythm with PACs at 85. Biatrial enlargement. Borderline right axis deviation. Old anterior infarct. No STEMI. QTc 411. Recent Results (from the past 8 hour(s)) EKG, 12 LEAD, INITIAL Collection Time: 10/27/20 10:17 PM  
Result Value Ref Range Ventricular Rate 85 BPM  
 Atrial Rate 85 BPM  
 P-R Interval 140 ms QRS Duration 86 ms  
 Q-T Interval 346 ms  
 QTC Calculation (Bezet) 411 ms Calculated P Axis 85 degrees Calculated R Axis 100 degrees Calculated T Axis 81 degrees Diagnosis    
  !! AGE AND GENDER SPECIFIC ECG ANALYSIS !! Sinus rhythm with Possible Premature atrial complexes with Aberrant  
conduction Biatrial enlargement Rightward axis Anterior infarct , age undetermined Abnormal ECG When compared with ECG of 09-FEB-2017 10:15, Aberrant conduction is now Present Vent. rate has decreased BY  49 BPM 
ST now depressed in Inferior leads LACTIC ACID Collection Time: 10/27/20 10:28 PM  
Result Value Ref Range Lactic acid 2.1 (H) 0.4 - 2.0 MMOL/L  
CBC WITH AUTOMATED DIFF Collection Time: 10/27/20 11:21 PM  
Result Value Ref Range WBC 13.5 (H) 4.3 - 11.1 K/uL  
 RBC 4.55 4.23 - 5.6 M/uL  
 HGB 15.4 13.6 - 17.2 g/dL HCT 41.2 41.1 - 50.3 % MCV 90.5 79.6 - 97.8 FL  
 MCH 33.8 (H) 26.1 - 32.9 PG  
 MCHC 37.4 (H) 31.4 - 35.0 g/dL  
 RDW 14.1 11.9 - 14.6 % PLATELET 099 032 - 199 K/uL MPV 12.1 9.4 - 12.3 FL ABSOLUTE NRBC 0.05 0.0 - 0.2 K/uL  
 DF AUTOMATED NEUTROPHILS 88 (H) 43 - 78 % LYMPHOCYTES 6 (L) 13 - 44 % MONOCYTES 4 4.0 - 12.0 % EOSINOPHILS 1 0.5 - 7.8 % BASOPHILS 0 0.0 - 2.0 % IMMATURE GRANULOCYTES 1 0.0 - 5.0 %  
 ABS. NEUTROPHILS 11.9 (H) 1.7 - 8.2 K/UL  
 ABS. LYMPHOCYTES 0.8 0.5 - 4.6 K/UL  
 ABS. MONOCYTES 0.5 0.1 - 1.3 K/UL ABS. EOSINOPHILS 0.1 0.0 - 0.8 K/UL  
 ABS. BASOPHILS 0.0 0.0 - 0.2 K/UL  
 ABS. IMM. GRANS. 0.2 0.0 - 0.5 K/UL METABOLIC PANEL, COMPREHENSIVE Collection Time: 10/27/20 11:21 PM  
Result Value Ref Range Sodium 125 (L) 138 - 145 mmol/L Potassium 6.0 (H) 3.5 - 5.1 mmol/L Chloride 94 (L) 98 - 107 mmol/L  
 CO2 17 (L) 21 - 32 mmol/L Anion gap 14 7 - 16 mmol/L Glucose 100 65 - 100 mg/dL BUN 78 (H) 8 - 23 MG/DL Creatinine 1.28 0.8 - 1.5 MG/DL  
 GFR est AA >60 >60 ml/min/1.73m2 GFR est non-AA >60 >60 ml/min/1.73m2 Calcium 9.0 8.3 - 10.4 MG/DL Bilirubin, total 1.5 (H) 0.2 - 1.1 MG/DL  
 ALT (SGPT) 31 12 - 65 U/L  
 AST (SGOT) 101 (H) 15 - 37 U/L Alk. phosphatase 118 50 - 136 U/L Protein, total 7.9 6.3 - 8.2 g/dL Albumin 2.4 (L) 3.2 - 4.6 g/dL Globulin 5.5 (H) 2.3 - 3.5 g/dL A-G Ratio 0.4 (L) 1.2 - 3.5 MAGNESIUM Collection Time: 10/27/20 11:21 PM  
Result Value Ref Range Magnesium 2.5 (H) 1.8 - 2.4 mg/dL METABOLIC PANEL, COMPREHENSIVE Collection Time: 10/28/20  1:06 AM  
Result Value Ref Range Sodium 130 (L) 138 - 145 mmol/L Potassium 1.6 (LL) 3.5 - 5.1 mmol/L Chloride 96 (L) 98 - 107 mmol/L  
 CO2 19 (L) 21 - 32 mmol/L Anion gap 15 7 - 16 mmol/L Glucose 104 (H) 65 - 100 mg/dL BUN 79 (H) 8 - 23 MG/DL Creatinine 1.19 0.8 - 1.5 MG/DL  
 GFR est AA >60 >60 ml/min/1.73m2 GFR est non-AA >60 >60 ml/min/1.73m2 Calcium 8.9 8.3 - 10.4 MG/DL Bilirubin, total 1.3 (H) 0.2 - 1.1 MG/DL  
 ALT (SGPT) 24 12 - 65 U/L  
 AST (SGOT) 27 15 - 37 U/L Alk. phosphatase 108 50 - 136 U/L Protein, total 6.9 6.3 - 8.2 g/dL Albumin 2.6 (L) 3.2 - 4.6 g/dL Globulin 4.3 (H) 2.3 - 3.5 g/dL A-G Ratio 0.6 (L) 1.2 - 3.5 POTASSIUM Collection Time: 10/28/20  1:59 AM  
Result Value Ref Range Potassium 1.8 (LL) 3.5 - 5.1 mmol/L MAGNESIUM Collection Time: 10/28/20  1:59 AM  
Result Value Ref Range Magnesium 2.1 1.8 - 2.4 mg/dL Xr Chest [facility] Result Date: 10/27/2020 EXAM: Chest x-ray. INDICATION: Cough and dyspnea. COMPARISON: Prior CT chest on July 1, 2019. TECHNIQUE: Frontal view chest x-ray. FINDINGS: The lungs are hyperexpanded, consistent with underlying emphysema. No acute infiltrate or pulmonary edema is seen. The cardiac size, mediastinal contour and pulmonary vasculature are normal. No pneumothorax or pleural effusion is seen. IMPRESSION: 1. No acute process. 2. Emphysema. Medications  
potassium chloride (K-DUR, KLOR-CON) SR tablet 40 mEq (has no administration in time range) potassium chloride 20 mEq in 100 ml IVPB (has no administration in time range)  
sodium chloride 0.9 % bolus infusion 1,000 mL (0 mL IntraVENous IV Completed 10/28/20 0140) Procedures 
 
 update notes 1:49 AM-initial CMP was hemolyzed with potassium of 6.0 and mag of 2.5. Repeat draw was markedly low potassium at 1.7. On recheck of the EKG, it does appear he actually has U waves. Will check 1 more time on his potassium and magnesium but anticipate hypokalemia and dehydration. Likely admission to hospitalist. 2:23 AM-repeat potassium is 1.8 with a low mag of 2.1. Oral and IV repletion started. Will discuss with hospitalist about admission. Disposition:   
Admit Diagnosis: ICD-10-CM ICD-9-CM 1. [facility]LL [facility]  N17.9 584.9 2. Hypokalemia  E87.6 276.8 3. Hyponatremia  E87.1 276.1 4. Adult failure to thrive  R62.7 783.7  
 
 
 
__________________________________________________________ Please note, this chart was dictated using Dragon dictation, voice recognition software. While efforts were made to correct any transcription errors, some may inadvertently remain. Please forgive punctuation and typographic/voice recognition errors. Please contact me with any questions concerns or for clarification of documentation.

## 2020-10-28 NOTE — PROGRESS NOTES
TRANSFER - IN REPORT: 
 
Verbal report received from Zeeshan Hsu RN(name) on Everett Toledo  being received from ED(unit) for routine progression of care Report consisted of patients Situation, Background, Assessment and  
Recommendations(SBAR). Information from the following report(s) SBAR was reviewed with the receiving nurse. Opportunity for questions and clarification was provided. Assessment completed upon patients arrival to unit and care assumed.

## 2020-10-29 NOTE — PROGRESS NOTES
Hourly rounding completely during the shift. Pt complained of pain and given medication per MAR. All needs met. Bed L/L with call bell in reach. Report given to oncoming nurse.

## 2020-10-29 NOTE — PROGRESS NOTES
Hourly rounds completed this shift. Patient complained of pain & medicated per MAR. All needs met at this time. Will continue to monitor & give report to oncoming RN.

## 2020-10-29 NOTE — INTERDISCIPLINARY ROUNDS
Interdisciplinary team rounds were held 10/29/2020 with the following team members:Care Management, Nursing, Physical Therapy and Physician and the patient and child(walter). Plan of care discussed. See clinical pathway and/or care plan for interventions and desired outcomes. Pt has been refusing K+. Pt needs K+ replacement as results were 1.9 today. Pt remains on telemetry.

## 2020-10-29 NOTE — PROGRESS NOTES
10/29/2020 Attempted to see patient for OT assessment. Pt rolled his eyes when I introduced myself from therapy. Pt states to just leave him alone to sleep that he doesn't want to do therapy right now. Will re-attempt this afternoon as time permits. Thank you, Armin Colindres, OT

## 2020-10-29 NOTE — CONSULTS
Comprehensive Nutrition Assessment Type and Reason for Visit: Consult, Initial 
Consult for Poor po intake > 5 days (Hospitalist) Nutrition Recommendations/Plan:  
Oral Nutrition: 
Continue current diet, encouraged pt to select with PDA for meals. Ensure Enlive TID (chocolate) added. This will ~75% kcal needs and 100% protein needs. Pt seems receptive to drinking an adequate amount orally if provided to meet needs at this time. Nutrition History: Pt reports he stopped eating ~ 3 weeks ago. H&P indicates he also stopped drinking ~12 beers daily at this time. He indicates in the past he consumed ensure until it became too expensive. He reports drinking water, juice and coffee only over the past 3 weeks. Nutrition Assessment:  PMH of COPD, HCV, HLD, tobacco abuse, ETOH abuse. PAst workup for wt loss included CT chest abdomen pelvis 6/5/19 without obvious malignancy, stable pulmonary nodules, gastric wall thickening concerning for gastritis he refused follow-up EGD and colonoscopy. Admitted with hypokalemia, FTT, poor oral intake, wt loss, cachexia. Pt withdrawn at RD visit. He has tea at the bedside consumed. He reports he is drinking water and milk but he doesn't like the coffee. He request that we not send him any trays. He is agreeable to drink chocolate ensure and started to drink one during my visit. Discussed we have to send a tray for him and offered items he can select. He only provides a recent hx of nutritional intake and states \"I'm probably not 100#.\"   He is too weak to stand for a weight and his bed scale is turned off at my visit. Given his hx of failure to follow-up with prior findings and reported decline at home, his malnutrition is potentially chronic on acute however inadequate data available to validate chronic criteria. Malnutrition Assessment: 
Malnutrition Status:  Severe malnutrition Context:  Acute illness Findings of the 6 clinical characteristics of malnutrition:  
Energy Intake:  7 - 50% or less of est energy requirements for 5 or more days Weight Loss:  (potentially with 11% wt loss, unable to evaluate w only estimated wt available) Body Fat Loss:  7 - Moderate body fat loss, Fat overlying ribs, Orbital, Triceps Muscle Mass Loss:  7 - Moderate muscle mass loss, Clavicles (pectoralis & deltoids), Scapula (trapezius), Hand (interosseous), Thigh (quadraceps), Calf, Temples (temporalis) Fluid Accumulation:  No significant fluid accumulation  Strength:  Not performed Estimated Daily Nutrient Needs: 
Energy (kcal): 7346-0082(03-18 kcal/kg); Weight Used for Energy Requirements: Admission Protein (g): 55-68(1.2-1.5 g/kg); Weight Used for Protein Requirements: Admission Fluid (ml/day): 1498-5616; Weight Used for Fluid Requirements: Admission Nutrition Related Findings: Abdominal Status (last documented): Last BM (per pt 2-3 wks ago). Pertinent Labs: K 2.3, glu 154, BUN 33 Pertinent Medications: D5 LR with 20meq KCl @ 100ml/hr, Mag-ox, protonix, s/p mg sulfate replacement, s/p KCl replacement Current Nutrition Therapies: DIET REGULAR Anthropometric Measures: 
· Height:  6' (182.9 cm) · Current Body Wt:  45.4 kg (100 lb 1.4 oz) estimated wt · Admission Body Wt:  100 lb 1.4 oz  estimated wt · Usual Body Wt:  50.8 kg (112 lb)(per EMR review wt from 1/15/2020) · Ideal Body Wt:  178 lbs:  56.2 % · Body mass index is 13.56 kg/m². · BMI Category:  Underweight (BMI less than 18.5) Nutrition Diagnosis: · Severe malnutrition, In context of acute illness or injury related to (refusing to eat) as evidenced by severe loss of subcutaneous fat, severe muscle loss(po recall <15% estimated needs) Nutrition Interventions:  
Food and/or Nutrient Delivery: Continue current diet, Start oral nutrition supplement Coordination of Nutrition Care:  Plan of Care Discussed with Delores Castillo and Khadijah Samson, RNs. Goals: 
Meet >50% estimated needs by nutrition follow-up Nutrition Monitoring and Evaluation:  
Behavioral-Environmental Outcomes: Readiness for change Food/Nutrient Intake Outcomes: Food and nutrient intake, Supplement intake Physical Signs/Symptoms Outcomes: Biochemical data Discharge Planning: Too soon to determine Chino Morgan RD, LDN on 10/29/2020 at 3:12 PM 
Contact: [phone]

## 2020-10-29 NOTE — PROGRESS NOTES
Care Management Interventions PCP Verified by CM: Yes(Dr. Cony Yao) Mode of Transport at Discharge: Other (see comment)(To be determined basedon needs) Transition of Care Consult (CM Consult): Discharge Planning Discharge Durable Medical Equipment: No(has a walker at home but does not use) Physical Therapy Consult: Yes Occupational Therapy Consult: Yes Speech Therapy Consult: No 
Current Support Network: Family Lives [place], Lives with Caregiver, Other(Lives with his son [facility]) Confirm Follow Up Transport: Family The Patient and/or Patient Representative was Provided with a Choice of Provider and Agrees with the Discharge Plan?: Yes Name of the Patient Representative Who was Provided with a Choice of Provider and Agrees with the Discharge Plan: [facility] of Choice List was Provided with Basic Dialogue that Supports the Patient's Individualized Plan of Care/Goals, Treatment Preferences and Shares the Quality Data Associated with the Providers?: Yes [employer] Information Provided?: No 
Discharge Location Discharge Placement: Skilled nursing facility CM spoke with patient son [facility]. [facility] verified all demographic information to be correct. [facility] stated patient lives with he and his wife in a single wide trailor. [facility] stated their are 4 steps to enter the residence. Patient has a walker at home but has been refusing to use it as patient has been too weak to get oob. [facility] stated patient has been sleeping on the couch and has been using a cup as a urinal.  [facility] stated patient is completely dependent with ADL's but will not allow anyone to assist him. Patient is able to afford his needed medications and uses [facility] pharmacy. [facility] stated patient has never used HH or been to STR but feels it would be best for patient to discharge to STR. PT/OT has been consulted, PPD has been ordered.   CM will continue to follow patient during hospitalization for discharge planning and needs. Please consult or notify CM of new needs.

## 2020-10-29 NOTE — PROGRESS NOTES
Hospitalist Progress Note Admit Date:  10/27/2020 10:06 PM  
Name:  Karmen Barajas Age:  61 y.o. 
:  1960 MRN:  [id number] PCP:  Yifan Downs MD 
Treatment Team: Attending Provider: Eligio Savage DO; Utilization Review: Deysi Pratt; Occupational Therapist: Maia Marquez OT; Care Manager: Desiree Fisher RN Subjective: HPI and or CC: Copied from history and physical HPIadmit : 
Patient is a 65yo M with a history of COPD, HCV, HLD, tobacco abuse, and alcohol abuse who presents with weakness and decreased intake for three weeks. He was made to come in by his son. He has been in generally poor health for years but acute decline in the last month. He quit smoking and drinking (former 12 beers most days) in the same timeline as well. Did not have obvious withdrawal symptoms. Has been drinking water and sometimes juice, hasn't eaten any solid food in three weeks. No appetite, some vague abdominal pain, not currently. Was previously thin, obviously losing weight quickly now. Found in the ER to have severe hypokalemia.  
  
Prior workup for weight loss included CT chest abdomen pelvis 19 without obvious malignancy, with stable pulm nodules, gastric wall thickening concerning for gastritis. Had planned EGD and colonoscopy, but refused. Is past due for repeat CT chest cancer screening. 2020: 
Chief complaintprofound weaknessrefusing oral potassium Profound hypokalemia with serum potassium 1.9. Serum magnesium reported at greater than 2 but prior alcohol abuse and poor oral intake. Supplemented with magnesium and found out that he refused at least one of his morning doses of aggressive potassium replacement and will not take any potassium by mouth. Offered effervescent/elixir and he refuses. He wants potassium IV. Will need to administer 20 mg every 2 hours frequently.   He will likely need at least 10 doses and probably more given serum potassium level significantly below 3. Further assessment about functional status, weakness to be determined after replacement of significant electrolyte abnormality. He likely has significant total and protein calorie malnutrition and nothing solid to eat for 3 weeks. Previous work-up for possible malignancy included a June 2019 CT scan which did show gastric wall thickening concerning for possible gastritis. Refused EGD and colonoscopy and will continue to refuse by his report today. May need to consider discussion regarding end-of-life care but I am going to reassess once to correct his electrolyte abnormality. No recent TSH. No alcohol in over 1 month per patient Objective:  
 
Patient Vitals for the past 24 hrs: 
 Temp Pulse Resp BP SpO2  
10/29/20 1213 98.1 °F (36.7 °C) 71 18 101/69 98 % 10/29/20 0859 98.3 °F (36.8 °C) 69 18 93/64 99 % 10/29/20 0858    93/64   
10/29/20 0752     97 % 10/29/20 0400 98 °F (36.7 °C) 79 18 105/69 97 % 10/29/20 0040 98 °F (36.7 °C) 80 18 100/67 97 % 10/28/20 2010     98 % 10/28/20 1929 97.7 °F (36.5 °C) (!) 58 18 102/65 100 % 10/28/20 1713 98.1 °F (36.7 °C) 63 18 108/66 98 % 10/28/20 1519  (!) 51  116/67   
10/28/20 1438  (!) 51 18 102/68 98 % Oxygen Therapy O2 Sat (%): 98 % (10/29/20 1213) Pulse via Oximetry: 77 beats per minute (10/29/20 0752) O2 Device: Room air (10/29/20 1213) Intake/Output Summary (Last 24 hours) at 10/29/2020 1423 Last data filed at 10/29/2020 1215 Gross per 24 hour Intake 1396.28 ml Output 1450 ml Net -53.72 ml REVIEW OF SYSTEMS: Comprehensive ROS performed and negative except as stated in HPI. Physical Examination: 
General:    Withdrawn, resistant to intervention/care denies suicidal ideations. Poorly groomed Head:  Normocephalic, atraumatic, nares patent Eyes:  Extraocular movements intact, normal sclera CV:   RRR. No  Murmurs, clicks, or gallops, distal pulses intact Lungs:   Decreased breath sounds bilateral, no gross consolidation Abdomen:   Soft, nondistended, nontender. No obvious palpable masses Extremities: Dermal flaking, erythema feet bilateral.  Weak but palpable distal pulses in feet Skin:     Dermal changes lower extremities as above. No cellulitic margin. No ecchymoses. Neuro:  No gross focal deficits, no tremor Psych: Withdrawn affect. Uncooperative Data Review: 
I have reviewed all labs, meds, telemetry events, and studies from the last 24 hours. Recent Results (from the past 24 hour(s)) METABOLIC PANEL, BASIC Collection Time: 10/28/20  6:35 PM  
Result Value Ref Range Sodium 135 (L) 138 - 145 mmol/L Potassium 2.0 (L) 3.5 - 5.1 mmol/L Chloride 104 98 - 107 mmol/L  
 CO2 19 (L) 21 - 32 mmol/L Anion gap 12 7 - 16 mmol/L Glucose 122 (H) 65 - 100 mg/dL BUN 55 (H) 8 - 23 MG/DL Creatinine 1.07 0.8 - 1.5 MG/DL  
 GFR est AA >60 >60 ml/min/1.73m2 GFR est non-AA >60 >60 ml/min/1.73m2 Calcium 8.8 8.3 - 48.4 MG/DL  
METABOLIC PANEL, BASIC Collection Time: 10/29/20 12:33 AM  
Result Value Ref Range Sodium 136 136 - 145 mmol/L Potassium 2.0 (L) 3.5 - 5.1 mmol/L Chloride 107 98 - 107 mmol/L  
 CO2 19 (L) 21 - 32 mmol/L Anion gap 10 7 - 16 mmol/L Glucose 110 (H) 65 - 100 mg/dL BUN 48 (H) 8 - 23 MG/DL Creatinine 0.79 (L) 0.8 - 1.5 MG/DL  
 GFR est AA >60 >60 ml/min/1.73m2 GFR est non-AA >60 >60 ml/min/1.73m2 Calcium 8.4 8.3 - 12.4 MG/DL  
METABOLIC PANEL, BASIC Collection Time: 10/29/20  6:16 AM  
Result Value Ref Range Sodium 138 138 - 145 mmol/L Potassium 1.9 (LL) 3.5 - 5.1 mmol/L Chloride 107 98 - 107 mmol/L  
 CO2 20 (L) 21 - 32 mmol/L Anion gap 11 7 - 16 mmol/L Glucose 98 65 - 100 mg/dL BUN 43 (H) 8 - 23 MG/DL Creatinine 0.76 (L) 0.8 - 1.5 MG/DL  
 GFR est AA >60 >60 ml/min/1.73m2 GFR est non-AA >60 >60 ml/min/1.73m2  Calcium 9.0 8.3 - 10.4 MG/DL  
  
 
 All Micro Results None Current Meds: 
Current Facility-Administered Medications Medication Dose Route Frequency  diphenhydrAMINE (BENADRYL) capsule 25 mg  25 mg Oral Q6H PRN  
 magnesium oxide (MAG-OX) tablet 400 mg  400 mg Oral DAILY  tuberculin injection 5 Units  5 Units IntraDERMal ONCE  potassium chloride 20 mEq in 100 ml IVPB  20 mEq IntraVENous Q2H  
 [START ON 10/30/2020] multivitamin, stress formula (STRESS TAB) tablet 1 Tab  1 Tab Oral DAILY  [START ON 10/30/2020] thiamine HCL (B-1) tablet 100 mg  100 mg Oral DAILY  budesonide-formoteroL (SYMBICORT) 160-4.5 mcg/actuation HFA inhaler 2 Puff  2 Puff Inhalation BID RT  
 diazePAM (VALIUM) tablet 2 mg  2 mg Oral Q12H PRN  pantoprazole (PROTONIX) tablet 40 mg  40 mg Oral ACB  tiotropium bromide (SPIRIVA RESPIMAT) 2.5 mcg /actuation  2 Puff Inhalation DAILY  sodium chloride (NS) flush 5-40 mL  5-40 mL IntraVENous Q8H  
 sodium chloride (NS) flush 5-40 mL  5-40 mL IntraVENous PRN  
 acetaminophen (TYLENOL) tablet 650 mg  650 mg Oral Q6H PRN Or  
 acetaminophen (TYLENOL) suppository 650 mg  650 mg Rectal Q6H PRN  polyethylene glycol (MIRALAX) packet 17 g  17 g Oral DAILY PRN  promethazine (PHENERGAN) tablet 12.5 mg  12.5 mg Oral Q6H PRN Or  
 ondansetron (ZOFRAN) injection 4 mg  4 mg IntraVENous Q6H PRN  
 enoxaparin (LOVENOX) injection 40 mg  40 mg SubCUTAneous DAILY  dextrose 5% - LR with KCl 20 mEq/L infusion   IntraVENous CONTINUOUS  ibuprofen (MOTRIN) tablet 600 mg  600 mg Oral Q6H PRN Diet: DIET REGULAR Other Studies (last 24 hours): No results found. Assessment and Plan:  
 
Hospital Problems as of 10/29/2020 Date Reviewed: 7/24/2017 Codes Class Noted - Resolved POA Cachexia ([facility] ICD-10-CM: R64 
ICD-9-CM: 799.4  10/28/2020 - Present Yes Severe protein-calorie malnutrition ([facility] ICD-10-CM: J19 ICD-9-CM: 170  10/28/2020 - Present Yes Hypokalemia ICD-10-CM: E87.6 ICD-9-CM: 276.8  10/28/2020 - Present Yes * (Principal) Severe protein-calorie malnutrition Matt Yessenia: less than 60% of standard weight) ([facility] ICD-10-CM: O93 ICD-9-CM: 106  10/28/2020 - Present Unknown Weight loss ICD-10-CM: R63.4 ICD-9-CM: 783.21  1/15/2020 - Present Yes COPD (chronic obstructive pulmonary disease) ([facility]) ICD-10-CM: J44.9 ICD-9-CM: 233  4/9/2015 - Present Yes Hypertension ICD-10-CM: I10 
ICD-9-CM: 401.9  4/9/2015 - Present Yes A/P:   
Profound hypokalemia 
--IV serum potassium 20 mEq IV every 2 hours will need greater than 10 doses. Recheck later tonight and may need to reorder. Absolutely refusing oral potassium. Additional mag given. Assume hypomagnesemia  unless level approaches 3. Failure to thrive, weaknessreassess after correction of electrolyte abnormalities. May need nutrition consult but refusing EGD and colonoscopy and has refused in the past.  May need end-of-life discussion of determine patient is capable of decision-making and involved family. Claims he would absolutely refuse any recommended placement. Check TSH. Comorbidities as above include COPD, hepatitis C, alcohol abusenone in 1 month, hypertension, failure to thrive, weight loss/cachexia DC planning/Dispo: Home with home health DVT ppx: SCDs Code status: DNR Medical decision maker: Self, family contacts on file Signed: 
Joel GUTIERREZ

## 2020-10-30 NOTE — PROGRESS NOTES
Hourly rounding completely during the shift. Pt c/o feet/back pain and medication given per MAR. All needs met. Bed L/L with call bell in reach. Report given to oncoming nurse.

## 2020-10-30 NOTE — PROGRESS NOTES
Occupational Therapy Note: 
 
OT evaluation attempted. Patient adamantly refusing despite encouragement and education. Will return as schedule permits and patient is agreeable.   
 
Manda Aguilar, OTR/L

## 2020-10-30 NOTE — PROGRESS NOTES
Hourly rounding completed. Pt is alert oriented, but withdrawn. Still with poor appetite, offered snacks, drank a lot of water,voiding. Denies pain, call light within reach. Will give report to oncoming RN.

## 2020-10-30 NOTE — PROGRESS NOTES
Hospitalist Progress Note Admit Date:  10/27/2020 10:06 PM  
Name:  Ashanti Delgadillo Age:  61 y.o. 
:  1960 MRN:  [id number] PCP:  Yamel Jacob MD 
Treatment Team: Attending Provider: Luigi Nieto DO; Utilization Review: Juan Diego Kahn; Care Manager: Omar Madden RN; Physical Therapy Assistant: Sharri Whitehead PTA; Occupational Therapist: Oriana Arnold, OTR/L Subjective: HPI and or CC: Copied from history and physical HPIadmit : 
Patient is a 65yo M with a history of COPD, HCV, HLD, tobacco abuse, and alcohol abuse who presents with weakness and decreased intake for three weeks. He was made to come in by his son. He has been in generally poor health for years but acute decline in the last month. He quit smoking and drinking (former 12 beers most days) in the same timeline as well. Did not have obvious withdrawal symptoms. Has been drinking water and sometimes juice, hasn't eaten any solid food in three weeks. No appetite, some vague abdominal pain, not currently. Was previously thin, obviously losing weight quickly now. Found in the ER to have severe hypokalemia.  
  
Prior workup for weight loss included CT chest abdomen pelvis 19 without obvious malignancy, with stable pulm nodules, gastric wall thickening concerning for gastritis. Had planned EGD and colonoscopy, but refused. Is past due for repeat CT chest cancer screening. 2020: 
Chief complaintprofound weaknessrefusing oral potassium Profound hypokalemia with serum potassium 1.9. Serum magnesium reported at greater than 2 but prior alcohol abuse and poor oral intake. Supplemented with magnesium and found out that he refused at least one of his morning doses of aggressive potassium replacement and will not take any potassium by mouth. Offered effervescent/elixir and he refuses. He wants potassium IV.   Will need to administer 20 mg every 2 hours frequently. He will likely need at least 10 doses and probably more given serum potassium level significantly below 3. Further assessment about functional status, weakness to be determined after replacement of significant electrolyte abnormality. He likely has significant total and protein calorie malnutrition and nothing solid to eat for 3 weeks. Previous work-up for possible malignancy included a June 2019 CT scan which did show gastric wall thickening concerning for possible gastritis. Refused EGD and colonoscopy and will continue to refuse by his report today. May need to consider discussion regarding end-of-life care but I am going to reassess once to correct his electrolyte abnormality. No recent TSH. No alcohol in over 1 month per patient October 30, 2020: 
Chief complaintweakness malaise poor appetite Significantly improved today with near correction of hypokalemia which was profound. Magnesium 1.9 and potassium 3.7. Receiving nutritional support with Ensure and appetite remains poor. [place] has improved today. We discussed options for appetite stimulation including Marinol and mirtazapine and he elects to gain \"avoid the marijuana\" despite my appropriate description and will agree to mirtazapine. Supplement additional magnesium until level consistently above 2 given his prior alcohol abuse and likely significant total body magnesium depletion. Objective:  
 
Patient Vitals for the past 24 hrs: 
 Temp Pulse Resp BP SpO2  
10/30/20 1031 98.7 °F (37.1 °C) 82 17 (!) 96/58 97 % 10/30/20 1018     100 % 10/30/20 0718 98.2 °F (36.8 °C) 81 17 107/65 98 % 10/30/20 0501 97.6 °F (36.4 °C) 81 18 97/64 98 % 10/29/20 2346 97.7 °F (36.5 °C) 80 18 97/64 97 % 10/29/20 1953 97.9 °F (36.6 °C) 77 18 96/64 99 % 10/29/20 1948     98 % 10/29/20 1548 97.5 °F (36.4 °C) 68 18 (!) 93/53 99 % 10/29/20 1213 98.1 °F (36.7 °C) 71 18 101/69 98 % Oxygen Therapy O2 Sat (%): 97 % (10/30/20 1031) Pulse via Oximetry: 115 beats per minute (10/30/20 1018) O2 Device: Room air (10/30/20 1018) Intake/Output Summary (Last 24 hours) at 10/30/2020 1053 Last data filed at 10/30/2020 0976 Gross per 24 hour Intake 3080.71 ml Output 1400 ml Net 1680.71 ml REVIEW OF SYSTEMS: Comprehensive ROS performed and negative except as stated in HPI. Physical Examination: 
Physical Exam 
Vitals signs reviewed. Constitutional:   
   Appearance: He is ill-appearing. HENT:  
   Head: Atraumatic. Nose: Nose normal. No congestion. Mouth/Throat:  
   Mouth: Mucous membranes are moist.  
   Pharynx: Oropharynx is clear. Comments: Poor dentition Eyes:  
   Conjunctiva/sclera: Conjunctivae normal.  
   Pupils: Pupils are equal, round, and reactive to light. Neck: Musculoskeletal: Neck supple. No neck rigidity. Cardiovascular:  
   Rate and Rhythm: Normal rate and regular rhythm. Heart sounds: Normal heart sounds. No gallop. Pulmonary:  
   Breath sounds: No stridor. No wheezing. Abdominal:  
   General: Abdomen is flat. Tenderness: There is no abdominal tenderness. Musculoskeletal:     
   General: No deformity. Comments: Significant erythema distal lower extremitiespoor hygiene. Dermal sloughing Skin: 
   General: Skin is dry. Capillary Refill: Capillary refill takes 2 to 3 seconds. Coloration: Skin is not jaundiced. Neurological:  
   General: No focal deficit present. Mental Status: He is alert. Cranial Nerves: No cranial nerve deficit. Psychiatric:  
   Comments: More alert, more cooperative and outlook/affect appears improved today Data Review: 
I have reviewed all labs, meds, telemetry events, and studies from the last 24 hours. Recent Results (from the past 24 hour(s)) METABOLIC PANEL, BASIC Collection Time: 10/29/20  2:25 PM  
Result Value Ref Range  Sodium 137 136 - 145 mmol/L  
 Potassium 2.3 (L) 3.5 - 5.1 mmol/L Chloride 108 (H) 98 - 107 mmol/L  
 CO2 14 (L) 21 - 32 mmol/L Anion gap 15 7 - 16 mmol/L Glucose 154 (H) 65 - 100 mg/dL BUN 33 (H) 8 - 23 MG/DL Creatinine 1.01 0.8 - 1.5 MG/DL  
 GFR est AA >60 >60 ml/min/1.73m2 GFR est non-AA >60 >60 ml/min/1.73m2 Calcium 8.5 8.3 - 18.2 MG/DL  
METABOLIC PANEL, BASIC Collection Time: 10/29/20  5:56 PM  
Result Value Ref Range Sodium 140 138 - 145 mmol/L Potassium 2.4 (L) 3.5 - 5.1 mmol/L Chloride 110 (H) 98 - 107 mmol/L  
 CO2 17 (L) 21 - 32 mmol/L Anion gap 13 7 - 16 mmol/L Glucose 107 (H) 65 - 100 mg/dL BUN 30 (H) 8 - 23 MG/DL Creatinine 0.96 0.8 - 1.5 MG/DL  
 GFR est AA >60 >60 ml/min/1.73m2 GFR est non-AA >60 >60 ml/min/1.73m2 Calcium 8.5 8.3 - 71.0 MG/DL  
METABOLIC PANEL, BASIC Collection Time: 10/29/20  8:32 PM  
Result Value Ref Range Sodium 140 138 - 145 mmol/L Potassium 3.0 (L) 3.5 - 5.1 mmol/L Chloride 112 (H) 98 - 107 mmol/L  
 CO2 16 (L) 21 - 32 mmol/L Anion gap 12 7 - 16 mmol/L Glucose 141 (H) 65 - 100 mg/dL BUN 28 (H) 8 - 23 MG/DL Creatinine 0.91 0.8 - 1.5 MG/DL  
 GFR est AA >60 >60 ml/min/1.73m2 GFR est non-AA >60 >60 ml/min/1.73m2 Calcium 8.5 8.3 - 98.1 MG/DL  
METABOLIC PANEL, BASIC Collection Time: 10/30/20  1:30 AM  
Result Value Ref Range Sodium 141 136 - 145 mmol/L Potassium 3.3 (L) 3.5 - 5.1 mmol/L Chloride 113 (H) 98 - 107 mmol/L  
 CO2 19 (L) 21 - 32 mmol/L Anion gap 9 7 - 16 mmol/L Glucose 125 (H) 65 - 100 mg/dL BUN 26 (H) 8 - 23 MG/DL Creatinine 0.77 (L) 0.8 - 1.5 MG/DL  
 GFR est AA >60 >60 ml/min/1.73m2 GFR est non-AA >60 >60 ml/min/1.73m2 Calcium 8.5 8.3 - 10.4 MG/DL MAGNESIUM Collection Time: 10/30/20  1:30 AM  
Result Value Ref Range Magnesium 1.9 1.8 - 2.4 mg/dL TSH 3RD GENERATION Collection Time: 10/30/20  1:30 AM  
Result Value Ref Range TSH 2.730 0.358 - 3.740 uIU/mL CBC WITH AUTOMATED DIFF Collection Time: 10/30/20  1:30 AM  
Result Value Ref Range WBC 7.8 4.3 - 11.1 K/uL  
 RBC 3.05 (L) 4.23 - 5.6 M/uL  
 HGB 10.3 (L) 13.6 - 17.2 g/dL HCT 27.9 (L) 41.1 - 50.3 % MCV 91.5 79.6 - 97.8 FL  
 MCH 33.8 (H) 26.1 - 32.9 PG  
 MCHC 36.9 (H) 31.4 - 35.0 g/dL  
 RDW 13.3 11.9 - 14.6 % PLATELET 932 (L) 354 - 450 K/uL MPV 10.5 9.4 - 12.3 FL ABSOLUTE NRBC 0.05 0.0 - 0.2 K/uL  
 DF AUTOMATED NEUTROPHILS 75 43 - 78 % LYMPHOCYTES 15 13 - 44 % MONOCYTES 9 4.0 - 12.0 % EOSINOPHILS 1 0.5 - 7.8 % BASOPHILS 0 0.0 - 2.0 % IMMATURE GRANULOCYTES 0 0.0 - 5.0 %  
 ABS. NEUTROPHILS 5.8 1.7 - 8.2 K/UL  
 ABS. LYMPHOCYTES 1.2 0.5 - 4.6 K/UL  
 ABS. MONOCYTES 0.7 0.1 - 1.3 K/UL  
 ABS. EOSINOPHILS 0.1 0.0 - 0.8 K/UL  
 ABS. BASOPHILS 0.0 0.0 - 0.2 K/UL  
 ABS. IMM. GRANS. 0.0 0.0 - 0.5 K/UL METABOLIC PANEL, BASIC Collection Time: 10/30/20  9:04 AM  
Result Value Ref Range Sodium 140 136 - 145 mmol/L Potassium 3.7 3.5 - 5.1 mmol/L Chloride 111 (H) 98 - 107 mmol/L  
 CO2 21 21 - 32 mmol/L Anion gap 8 7 - 16 mmol/L Glucose 86 65 - 100 mg/dL BUN 23 8 - 23 MG/DL Creatinine 0.62 (L) 0.8 - 1.5 MG/DL  
 GFR est AA >60 >60 ml/min/1.73m2 GFR est non-AA >60 >60 ml/min/1.73m2 Calcium 8.1 (L) 8.3 - 10.4 MG/DL All Micro Results None Current Meds: 
Current Facility-Administered Medications Medication Dose Route Frequency  mirtazapine (REMERON) tablet 7.5 mg  7.5 mg Oral QHS  magnesium sulfate 2 g/50 ml IVPB (premix or compounded)  2 g IntraVENous ONCE  potassium chloride 20 mEq in 100 ml IVPB  20 mEq IntraVENous Q2H  
 sucralfate (CARAFATE) tablet 1 g  1 g Oral AC&HS  
 Lactobacillus Acidoph & Bulgar (FLORANEX) tablet 2 Tab  2 Tab Oral BID  diphenhydrAMINE (BENADRYL) capsule 25 mg  25 mg Oral Q6H PRN  
 magnesium oxide (MAG-OX) tablet 400 mg  400 mg Oral DAILY  tuberculin injection 5 Units  5 Units IntraDERMal ONCE  
 multivitamin, stress formula (STRESS TAB) tablet 1 Tab  1 Tab Oral DAILY  thiamine HCL (B-1) tablet 100 mg  100 mg Oral DAILY  budesonide-formoteroL (SYMBICORT) 160-4.5 mcg/actuation HFA inhaler 2 Puff  2 Puff Inhalation BID RT  
 diazePAM (VALIUM) tablet 2 mg  2 mg Oral Q12H PRN  pantoprazole (PROTONIX) tablet 40 mg  40 mg Oral ACB  tiotropium bromide (SPIRIVA RESPIMAT) 2.5 mcg /actuation  2 Puff Inhalation DAILY  sodium chloride (NS) flush 5-40 mL  5-40 mL IntraVENous Q8H  
 sodium chloride (NS) flush 5-40 mL  5-40 mL IntraVENous PRN  
 acetaminophen (TYLENOL) tablet 650 mg  650 mg Oral Q6H PRN Or  
 acetaminophen (TYLENOL) suppository 650 mg  650 mg Rectal Q6H PRN  polyethylene glycol (MIRALAX) packet 17 g  17 g Oral DAILY PRN  promethazine (PHENERGAN) tablet 12.5 mg  12.5 mg Oral Q6H PRN Or  
 ondansetron (ZOFRAN) injection 4 mg  4 mg IntraVENous Q6H PRN  
 enoxaparin (LOVENOX) injection 40 mg  40 mg SubCUTAneous DAILY  ibuprofen (MOTRIN) tablet 600 mg  600 mg Oral Q6H PRN Diet: DIET REGULAR 
DIET NUTRITIONAL SUPPLEMENTS Other Studies (last 24 hours): No results found. Assessment and Plan:  
 
Hospital Problems as of 10/30/2020 Date Reviewed: 7/24/2017 Codes Class Noted - Resolved POA Cachexia ([facility] ICD-10-CM: R64 
ICD-9-CM: 799.4  10/28/2020 - Present Yes Severe protein-calorie malnutrition ([facility] ICD-10-CM: T41 ICD-9-CM: 142  10/28/2020 - Present Yes Hypokalemia ICD-10-CM: E87.6 ICD-9-CM: 276.8  10/28/2020 - Present Yes * (Principal) Severe protein-calorie malnutrition Antonio Goo: less than 60% of standard weight) ([facility] ICD-10-CM: U03 ICD-9-CM: 124  10/28/2020 - Present Unknown Weight loss ICD-10-CM: R63.4 ICD-9-CM: 783.21  1/15/2020 - Present Yes COPD (chronic obstructive pulmonary disease) (HCC) ICD-10-CM: J44.9 ICD-9-CM: 387  4/9/2015 - Present Yes Hypertension ICD-10-CM: I10 
ICD-9-CM: 401.9  4/9/2015 - Present Yes A/P:   
Profound hypokalemia 
--Continue supplementation until potassium 4 magnesium greater than 2suspect very significant total body magnesium depletion. Continue supportive care. Failure to thrive, weaknessthere is improved and willing to try to increase oral intake. Anorexia, failure to thrive Possible occult depressionformer alcohol abuse quit 1 month ago. Agreeable to mirtazapine as appetite stimulant nocturnally. We will add Carafate to PPI regarding suspected gastritis clinically. Encourage oral intake. He is consuming Ensure twice daily and attempting meals. Very poor dentitionwill need outpatient dental referral remained stable. Comorbidities as above include COPD, hepatitis C, alcohol abusenone in 1 month, hypertension, failure to thrive, weight loss/cachexia Possible discharge early next week with home health if he is agreeable affect and desire to improve his health appear improved today following correction of profound hypokalemia and nutritional/vitamin mineral supplementation. DC planning/Dispo: Home with home health DVT ppx: SCDs Code status: DNR Medical decision maker: Self, family contacts on file Signed: 
Joel GUTIERREZ

## 2020-10-30 NOTE — PROGRESS NOTES
Occupational Therapy Note: 
 
OT evaluation re-attempted. Patient refusing despite encouragement and education. Requesting to be taken off of therapy caseload. Will d/c from OT caseload.   
 
Manda Neville, OTR/L

## 2020-10-31 PROBLEM — N17.9 AKI (ACUTE KIDNEY INJURY) (HCC): Status: ACTIVE | Noted: 2020-01-01

## 2020-10-31 PROBLEM — E83.42 HYPOMAGNESEMIA: Status: ACTIVE | Noted: 2020-01-01

## 2020-10-31 PROBLEM — G62.9 PERIPHERAL NEUROPATHY: Status: ACTIVE | Noted: 2020-01-01

## 2020-10-31 NOTE — PROGRESS NOTES
Hospitalist Progress Note Admit Date:  10/27/2020 10:06 PM  
Name:  Jose Fitzgerald Age:  61 y.o. 
:  1960 MRN:  [id number] PCP:  Mack Stone MD 
Treatment Team: Attending Provider: Noman Sifuentes DO; Utilization Review: Sukumar Loyola; Care Manager: Lisette Soulier, RN Subjective: HPI and or CC: Copied from history and physical HPIadmit : 
Patient is a 63yo M with a history of COPD, HCV, HLD, tobacco abuse, and alcohol abuse who presents with weakness and decreased intake for three weeks. He was made to come in by his son. He has been in generally poor health for years but acute decline in the last month. He quit smoking and drinking (former 12 beers most days) in the same timeline as well. Did not have obvious withdrawal symptoms. Has been drinking water and sometimes juice, hasn't eaten any solid food in three weeks. No appetite, some vague abdominal pain, not currently. Was previously thin, obviously losing weight quickly now. Found in the ER to have severe hypokalemia.  
  
Prior workup for weight loss included CT chest abdomen pelvis 19 without obvious malignancy, with stable pulm nodules, gastric wall thickening concerning for gastritis. Had planned EGD and colonoscopy, but refused. Is past due for repeat CT chest cancer screening. 2020: 
Chief complaintprofound weaknessrefusing oral potassium Profound hypokalemia with serum potassium 1.9. Serum magnesium reported at greater than 2 but prior alcohol abuse and poor oral intake. Supplemented with magnesium and found out that he refused at least one of his morning doses of aggressive potassium replacement and will not take any potassium by mouth. Offered effervescent/elixir and he refuses. He wants potassium IV. Will need to administer 20 mg every 2 hours frequently.   He will likely need at least 10 doses and probably more given serum potassium level significantly below 3. Further assessment about functional status, weakness to be determined after replacement of significant electrolyte abnormality. He likely has significant total and protein calorie malnutrition and nothing solid to eat for 3 weeks. Previous work-up for possible malignancy included a June 2019 CT scan which did show gastric wall thickening concerning for possible gastritis. Refused EGD and colonoscopy and will continue to refuse by his report today. May need to consider discussion regarding end-of-life care but I am going to reassess once to correct his electrolyte abnormality. No recent TSH. No alcohol in over 1 month per patient October 30, 2020: 
Chief complaintweakness malaise poor appetite Significantly improved today with near correction of hypokalemia which was profound. Magnesium 1.9 and potassium 3.7. Receiving nutritional support with Ensure and appetite remains poor. [place] has improved today. We discussed options for appetite stimulation including Marinol and mirtazapine and he elects to gain \"avoid the marijuana\" despite my appropriate description and will agree to mirtazapine. Supplement additional magnesium until level consistently above 2 given his prior alcohol abuse and likely significant total body magnesium depletion. October 31, 2020: 
Chief complaintfoot lower extremity burning pain, poor appetite and poor oral intake Oral intake improving some since addition of Remeron, Carafate to PPI. Probiotic. Severe malnutrition and poor oral intake with depressed affect. Peripheral sensory neuropathy both mental status and neuropathy very likely nutritional component if not causative. Continue vitamin mineral calorie and protein/macronutrient support. Refusing PT and OT claims he would refuse home health.   Potassium corrected magnesium still low confirming suspected total body depletionformer alcoholic quit 1 month ago. Denies urge to drink alcohol. Objective:  
 
Patient Vitals for the past 24 hrs: 
 Temp Pulse Resp BP SpO2  
10/31/20 1145 98.2 °F (36.8 °C) 88 17 105/68 98 % 10/31/20 1042     100 % 10/31/20 0754 98.6 °F (37 °C) 95 17 (!) 110/54 98 % 10/31/20 0500 98.5 °F (36.9 °C) 82 18 105/66 96 % 10/30/20 2342 98.4 °F (36.9 °C) 70 18 95/62 99 % 10/30/20 2051     98 % 10/30/20 2023 98.6 °F (37 °C) 80 18 112/72 98 % 10/30/20 1643 98.4 °F (36.9 °C) 77 17 100/69 98 % Oxygen Therapy O2 Sat (%): 98 % (10/31/20 1145) Pulse via Oximetry: 80 beats per minute (10/30/20 2051) O2 Device: Room air (10/31/20 1042) Intake/Output Summary (Last 24 hours) at 10/31/2020 1523 Last data filed at 10/31/2020 3774 Gross per 24 hour Intake 720 ml Output 750 ml Net -30 ml REVIEW OF SYSTEMS: Comprehensive ROS performed and negative except as stated in HPI. Physical Examination: 
Physical Exam 
Vitals signs and nursing note reviewed. Constitutional:   
   Appearance: He is ill-appearing. He is not diaphoretic. Comments: cachectic HENT:  
   Nose: No rhinorrhea. Mouth/Throat:  
   Mouth: Mucous membranes are dry. Pharynx: No oropharyngeal exudate. Comments: Poor dentition Eyes:  
   General: No scleral icterus. Extraocular Movements: Extraocular movements intact. Neck: Musculoskeletal: Normal range of motion. Cardiovascular:  
   Heart sounds: No friction rub. Pulmonary:  
   Effort: No respiratory distress. Breath sounds: No rhonchi. Abdominal:  
   General: Bowel sounds are normal. There is no distension. Palpations: Abdomen is soft. Musculoskeletal:     
   General: No swelling or deformity. Comments: Significant erythema distal lower extremities-has improved now mostly erythema distal toes with good capillary refill. Lymphadenopathy:  
   Cervical: No cervical adenopathy. Skin: 
   General: Skin is warm. Capillary Refill: Capillary refill takes 2 to 3 seconds. Findings: No bruising. Comments: Dermal sloughing lower extremities. Neurological:  
   Mental Status: He is oriented to person, place, and time. Gait: Gait normal.  
Psychiatric:     
   Behavior: Behavior normal.     
   Thought Content: Thought content normal.  
   Comments: More alert, more cooperative and outlook/affect appears improved today Data Review: 
I have reviewed all labs, meds, telemetry events, and studies from the last 24 hours. Recent Results (from the past 24 hour(s)) MAGNESIUM Collection Time: 10/31/20  5:49 AM  
Result Value Ref Range Magnesium 1.5 (L) 1.8 - 2.4 mg/dL METABOLIC PANEL, BASIC Collection Time: 10/31/20  5:49 AM  
Result Value Ref Range Sodium 139 136 - 145 mmol/L Potassium 4.6 3.5 - 5.1 mmol/L Chloride 109 (H) 98 - 107 mmol/L  
 CO2 25 21 - 32 mmol/L Anion gap 5 (L) 7 - 16 mmol/L Glucose 68 65 - 100 mg/dL BUN 20 8 - 23 MG/DL Creatinine 0.60 (L) 0.8 - 1.5 MG/DL  
 GFR est AA >60 >60 ml/min/1.73m2 GFR est non-AA >60 >60 ml/min/1.73m2 Calcium 8.5 8.3 - 10.4 MG/DL All Micro Results None Current Meds: 
Current Facility-Administered Medications Medication Dose Route Frequency  mirtazapine (REMERON) tablet 15 mg  15 mg Oral QHS  magnesium oxide (MAG-OX) tablet 400 mg  400 mg Oral BID  sucralfate (CARAFATE) tablet 1 g  1 g Oral AC&HS  
 Lactobacillus Acidoph & Bulgar [facility]) tablet 2 Tab  2 Tab Oral BID  chlorhexidine (PERIDEX) 0.12 % mouthwash 15 mL  15 mL Oral Q12H  diphenhydrAMINE (BENADRYL) capsule 25 mg  25 mg Oral Q6H PRN  
 multivitamin, stress formula (STRESS TAB) tablet 1 Tab  1 Tab Oral DAILY  thiamine HCL (B-1) tablet 100 mg  100 mg Oral DAILY  budesonide-formoteroL (SYMBICORT) 160-4.5 mcg/actuation HFA inhaler 2 Puff  2 Puff Inhalation BID RT  
 diazePAM (VALIUM) tablet 2 mg  2 mg Oral Q12H PRN  
  pantoprazole (PROTONIX) tablet 40 mg  40 mg Oral ACB  tiotropium bromide (SPIRIVA RESPIMAT) 2.5 mcg /actuation  2 Puff Inhalation DAILY  sodium chloride (NS) flush 5-40 mL  5-40 mL IntraVENous Q8H  
 sodium chloride (NS) flush 5-40 mL  5-40 mL IntraVENous PRN  
 acetaminophen (TYLENOL) tablet 650 mg  650 mg Oral Q6H PRN Or  
 acetaminophen (TYLENOL) suppository 650 mg  650 mg Rectal Q6H PRN  polyethylene glycol (MIRALAX) packet 17 g  17 g Oral DAILY PRN  promethazine (PHENERGAN) tablet 12.5 mg  12.5 mg Oral Q6H PRN Or  
 ondansetron (ZOFRAN) injection 4 mg  4 mg IntraVENous Q6H PRN  
 enoxaparin (LOVENOX) injection 40 mg  40 mg SubCUTAneous DAILY  ibuprofen (MOTRIN) tablet 600 mg  600 mg Oral Q6H PRN Diet: DIET REGULAR 
DIET NUTRITIONAL SUPPLEMENTS Other Studies (last 24 hours): No results found. Assessment and Plan:  
 
Hospital Problems as of 10/31/2020 Date Reviewed: 7/24/2017 Codes Class Noted - Resolved POA Peripheral neuropathy ICD-10-CM: G62.9 ICD-9-CM: 356.9  10/31/2020 - Present Unknown Hypomagnesemia ICD-10-CM: E50.49 
ICD-9-CM: 275.2  10/31/2020 - Present Unknown MARIBELL (acute kidney injury) ([facility] ICD-10-CM: N17.9 ICD-9-CM: 584.9  10/31/2020 - Present Unknown Cachexia ([facility] ICD-10-CM: R64 
ICD-9-CM: 799.4  10/28/2020 - Present Yes Severe protein-calorie malnutrition ([facility] ICD-10-CM: V29 ICD-9-CM: 449  10/28/2020 - Present Yes Hypokalemia ICD-10-CM: E87.6 ICD-9-CM: 276.8  10/28/2020 - Present Yes * (Principal) Severe protein-calorie malnutrition Kenvil Sayres: less than 60% of standard weight) ([facility] ICD-10-CM: K10 ICD-9-CM: 933  10/28/2020 - Present Unknown Weight loss ICD-10-CM: R63.4 ICD-9-CM: 783.21  1/15/2020 - Present Yes COPD (chronic obstructive pulmonary disease) (HCC) ICD-10-CM: J44.9 ICD-9-CM: 369  4/9/2015 - Present Yes  Hypertension ICD-10-CM: [employer] 
 ICD-9-CM: 401.9  4/9/2015 - Present Yes A/P:   
Profound hypokalemiacorrected. Associated hypomagnesemiaas suspected recurrent serum hypomagnesemia suggesting total body depletion. --IV 4 g magnesium sulfate continue oral therapy. Potassium now greater than 4. Discontinue intravenous potassium and IV fluids. bib on admit--resolved Failure to thrive, weaknessrefusing physical therapy, I discussed home with home health and home PT and he claims he would not agree because he lives with his son and son's girlfriend and they have many dogs and they would bite any visitors and he is not ready to have a lawsuit. Anorexia, failure to thrive Possible occult depressionformer alcohol abuse quit 1 month ago. Tolerating current medications but complaining about oral meds. Titrate Remeronappetite still poor. Improved from admission Peripheral sensory neuropathydefinitely nutritional componentB12 level pending. Lower extremity dermal findings improved with nutritional supplementation/vitamin/mineral supplementation Very poor dentitionwill need outpatient dental referral remained stable. Continue Peridex Depressed affectdenies suicidal thoughts or ideationscontinue Remeron for appetite stimulation and most definitely malnutrition is contributing to current mental status. Comorbidities as above include COPD, hepatitis C, alcohol abusenone in 1 month, hypertension, failure to thrive, weight loss/cachexia Possible discharge next few days if eating DC planning/Dispo: Home with home health DVT ppx: SCDs Code status: DNR Medical decision maker: Self, family contacts on file Signed: 
Joel GUTIERREZ

## 2020-10-31 NOTE — PROGRESS NOTES
Physical Therapy Note: 
 
Therapist is discontinuing acute PT services due to pt's request to be taken off therapy caseload. PT clarified with patient his wishes. Pt verbalized understanding. Please re-consult acute PT/OT services if patient's wishes change. Thank you, Bhupendra Tobias, PT, DPT

## 2020-10-31 NOTE — PROGRESS NOTES
Physician Progress Note Lisandro HERNANDEZ #:                  [id number] :                       1960 ADMIT DATE:       10/27/2020 10:06 PM 
DISCH DATE: 
RESPONDING 
PROVIDER #:        RAKESH CARLSON DO 
 
 
 
 
QUERY TEXT: 
 
Pt admitted with malnutrition. Pt noted to have MARIBELL in the ED notes and did have significant improvement in creatinine since admission. If possible, please document in the progress notes and discharge summary if you are evaluating and/or treating any of the following: The medical record reflects the following: 
Risk Factors: malnutrition, FTT Clinical Indicators: 
--10/27 through 10/30 Cr 1.28>1.19>1.05>1.0>1.07>0.79>0.76>1.01>0.96>0.77>0.62 
--10/27 ED MD note statin. MARIBELL (acute kidney injury) 2. Hypokalemia  3. Hyponatremia  4. Adult failure to thrive Treatment: serial trending electrolytes and IVF Defined by Kidney Disease Improving Global Outcomes (KDIGO) clinical practice guideline for acute kidney injury: 
-Increase in SCr by ? 0.3 mg/dl within 48 hours; or 
-Increase in SCr to ? 1.5 times baseline, which is known or presumed to have occurred within the prior 7 days; or 
-Urine volume < 0.5ml/kg/h for 6 hours Options provided: 
-- Acute kidney failure 
-- Acute kidney failure with acute tubular necrosis -- Acute kidney injury -- Other - I will add my own diagnosis -- Disagree - Not applicable / Not valid -- Disagree - Clinically unable to determine / Unknown 
-- Refer to Clinical Documentation Reviewer PROVIDER RESPONSE TEXT: 
 
This patient has an Acute kidney injury.  
 
Query created by: Gisela Melchor on 10/30/2020 4:00 PM 
 
 
Electronically signed by:  Rakesh Sanchez DO 10/31/2020 3:22 PM

## 2020-11-01 NOTE — PROGRESS NOTES
Hospitalist Progress Note Admit Date:  10/27/2020 10:06 PM  
Name:  Seun Emerson Age:  61 y.o. 
:  1960 MRN:  [id number] PCP:  Lee Ann Ludwig MD 
Treatment Team: Attending Provider: Que Marrero DO; Utilization Review: Pelon Lind; Care Manager: Fortino Dior RN; Primary Nurse: Juhi Workman RN Subjective: HPI and or CC: Copied from history and physical HPIadmit : 
Patient is a 65yo M with a history of COPD, HCV, HLD, tobacco abuse, and alcohol abuse who presents with weakness and decreased intake for three weeks. He was made to come in by his son. He has been in generally poor health for years but acute decline in the last month. He quit smoking and drinking (former 12 beers most days) in the same timeline as well. Did not have obvious withdrawal symptoms. Has been drinking water and sometimes juice, hasn't eaten any solid food in three weeks. No appetite, some vague abdominal pain, not currently. Was previously thin, obviously losing weight quickly now. Found in the ER to have severe hypokalemia.  
  
Prior workup for weight loss included CT chest abdomen pelvis 19 without obvious malignancy, with stable pulm nodules, gastric wall thickening concerning for gastritis. Had planned EGD and colonoscopy, but refused. Is past due for repeat CT chest cancer screening. 2020: 
Chief complaintprofound weaknessrefusing oral potassium Profound hypokalemia with serum potassium 1.9. Serum magnesium reported at greater than 2 but prior alcohol abuse and poor oral intake. Supplemented with magnesium and found out that he refused at least one of his morning doses of aggressive potassium replacement and will not take any potassium by mouth. Offered effervescent/elixir and he refuses. He wants potassium IV. Will need to administer 20 mg every 2 hours frequently.   He will likely need at least 10 doses and probably more given serum potassium level significantly below 3. Further assessment about functional status, weakness to be determined after replacement of significant electrolyte abnormality. He likely has significant total and protein calorie malnutrition and nothing solid to eat for 3 weeks. Previous work-up for possible malignancy included a June 2019 CT scan which did show gastric wall thickening concerning for possible gastritis. Refused EGD and colonoscopy and will continue to refuse by his report today. May need to consider discussion regarding end-of-life care but I am going to reassess once to correct his electrolyte abnormality. No recent TSH. No alcohol in over 1 month per patient October 30, 2020: 
Chief complaintweakness malaise poor appetite Significantly improved today with near correction of hypokalemia which was profound. Magnesium 1.9 and potassium 3.7. Receiving nutritional support with Ensure and appetite remains poor. [place] has improved today. We discussed options for appetite stimulation including Marinol and mirtazapine and he elects to gain \"avoid the marijuana\" despite my appropriate description and will agree to mirtazapine. Supplement additional magnesium until level consistently above 2 given his prior alcohol abuse and likely significant total body magnesium depletion. October 31, 2020: 
Chief complaintfoot lower extremity burning pain, poor appetite and poor oral intake Oral intake improving some since addition of Remeron, Carafate to PPI. Probiotic. Severe malnutrition and poor oral intake with depressed affect. Peripheral sensory neuropathy both mental status and neuropathy very likely nutritional component if not causative. Continue vitamin mineral calorie and protein/macronutrient support. Refusing PT and OT claims he would refuse home health.   Potassium corrected magnesium still low confirming suspected total body depletionformer alcoholic quit 1 month ago. Denies urge to drink alcohol. November 1 2020: 
Chief complaintpoor appetite, lower extremity pain and causalgia Has a history of neuropathylikely alcoholic. Was using gabapentin nocturnally only. Appetite appears to be minimally improved after several nights of mirtazapine, receiving empiric treatment for gastritis with Carafate and PPI. We discussed psychiatric consultation regarding depressed affect and as importantcompetency determination. Patient's son came to the nursing station last night very agitated claiming that he would not allow his father to come back and live with him because he did not want him \"dying on his couch\" he has legitimate concerns about patient's mental status, oral intake and overall health. I agree patient unlikely to progress if returns to prior situation without healthcare intervention and he claims he would refuse home health and he absolutely refuses placement. I discussed situation with patient and informed him he may need to be placed for rehabilitation at least short-term with goal of either independent living or return to live with son. He claims he spoke with a friend who is looking into getting him a trailer that he could live in and would agree to home health in his trailer if this is arranged. I explained this is not ideal.  Overall patient is more cooperative and rational after correction of profound hypokalemia and nutritional support. Encouraged him as his lower extremities erythema and dermal findings appear to be improving/resolving. Discussed titrate gabapentin and physical therapy evaluation and treatment with short-term rehab recommended regarding at least moderate likely severe total and protein calorie malnutritionawaiting nutritionist impression and recommendations. Patient agreeable to psychiatric and nutritional consultations Objective:  
 
Patient Vitals for the past 24 hrs: 
 Temp Pulse Resp BP SpO2 11/01/20 1145 98.7 °F (37.1 °C) 87 18 (!) 99/58 97 % 11/01/20 0820 98.7 °F (37.1 °C) 84 17 (!) 91/57 98 % 11/01/20 0723     97 % 11/01/20 0524 98.6 °F (37 °C) (!) 106 18 102/67 93 % 10/31/20 2326 99.5 °F (37.5 °C) 88 18 102/60 97 % 10/31/20 2017 98.7 °F (37.1 °C) 98 18 (!) 98/57 97 % 10/31/20 1938     98 % 10/31/20 1651 98.5 °F (36.9 °C) 84 18 109/63 97 % Oxygen Therapy O2 Sat (%): 97 % (11/01/20 1145) Pulse via Oximetry: 81 beats per minute (11/01/20 0723) O2 Device: Room air (11/01/20 0723) Intake/Output Summary (Last 24 hours) at 11/1/2020 1531 Last data filed at 11/1/2020 1240 Gross per 24 hour Intake  Output 1250 ml Net -1250 ml REVIEW OF SYSTEMS: Comprehensive ROS performed and negative except as stated in HPI. Physical Examination: 
Physical Exam 
Vitals signs and nursing note reviewed. Constitutional:   
   General: He is not in acute distress. Appearance: He is ill-appearing. Comments: cachectic HENT:  
   Head: Atraumatic. Nose: No congestion. Mouth/Throat:  
   Mouth: Mucous membranes are moist.  
   Pharynx: Oropharynx is clear. No posterior oropharyngeal erythema. Comments: Poor dentition Eyes:  
   Conjunctiva/sclera: Conjunctivae normal.  
   Pupils: Pupils are equal, round, and reactive to light. Neck: Musculoskeletal: Neck supple. No neck rigidity. Cardiovascular:  
   Rate and Rhythm: Normal rate and regular rhythm. Heart sounds: No gallop. Pulmonary:  
   Breath sounds: No wheezing or rales. Abdominal:  
   Tenderness: There is no abdominal tenderness. There is no guarding. Musculoskeletal: Normal range of motion. Right lower leg: No edema. Left lower leg: No edema. Comments: Significant erythema distal lower extremities-continues to improve Skin: 
   General: Skin is dry. Capillary Refill: Capillary refill takes 2 to 3 seconds. Coloration: Skin is not jaundiced. Comments: Dermal flaking lower ext Neurological:  
   General: No focal deficit present. Mental Status: He is alert. Cranial Nerves: No cranial nerve deficit. Psychiatric:     
   Mood and Affect: Mood normal.     
   Judgment: Judgment normal.  
   Comments: More alert, more cooperative and outlook/affect appears improved today Data Review: 
I have reviewed all labs, meds, telemetry events, and studies from the last 24 hours. Recent Results (from the past 24 hour(s)) VITAMIN B12 Collection Time: 11/01/20  5:59 AM  
Result Value Ref Range Vitamin B12 1,083 (H) 193 - 986 pg/mL CBC WITH AUTOMATED DIFF Collection Time: 11/01/20  5:59 AM  
Result Value Ref Range WBC 8.3 4.3 - 11.1 K/uL  
 RBC 2.96 (L) 4.23 - 5.6 M/uL  
 HGB 10.1 (L) 13.6 - 17.2 g/dL HCT 27.6 (L) 41.1 - 50.3 % MCV 93.2 79.6 - 97.8 FL  
 MCH 34.1 (H) 26.1 - 32.9 PG  
 MCHC 36.6 (H) 31.4 - 35.0 g/dL  
 RDW 13.5 11.9 - 14.6 % PLATELET 022 (L) 255 - 450 K/uL MPV 10.5 9.4 - 12.3 FL ABSOLUTE NRBC 0.00 0.0 - 0.2 K/uL  
 DF AUTOMATED NEUTROPHILS 62 43 - 78 % LYMPHOCYTES 20 13 - 44 % MONOCYTES 15 (H) 4.0 - 12.0 % EOSINOPHILS 1 0.5 - 7.8 % BASOPHILS 0 0.0 - 2.0 % IMMATURE GRANULOCYTES 2 0.0 - 5.0 %  
 ABS. NEUTROPHILS 5.1 1.7 - 8.2 K/UL  
 ABS. LYMPHOCYTES 1.7 0.5 - 4.6 K/UL  
 ABS. MONOCYTES 1.2 0.1 - 1.3 K/UL  
 ABS. EOSINOPHILS 0.1 0.0 - 0.8 K/UL  
 ABS. BASOPHILS 0.0 0.0 - 0.2 K/UL  
 ABS. IMM. GRANS. 0.1 0.0 - 0.5 K/UL METABOLIC PANEL, COMPREHENSIVE Collection Time: 11/01/20  5:59 AM  
Result Value Ref Range Sodium 137 (L) 138 - 145 mmol/L Potassium 3.6 3.5 - 5.1 mmol/L Chloride 103 98 - 107 mmol/L  
 CO2 29 21 - 32 mmol/L Anion gap 5 (L) 7 - 16 mmol/L Glucose 77 65 - 100 mg/dL BUN 14 8 - 23 MG/DL Creatinine 0.55 (L) 0.8 - 1.5 MG/DL  
 GFR est AA >60 >60 ml/min/1.73m2 GFR est non-AA >60 >60 ml/min/1.73m2  Calcium 7.7 (L) 8.3 - 10.4 MG/DL  
 Bilirubin, total 0.7 0.2 - 1.1 MG/DL  
 ALT (SGPT) 31 12 - 65 U/L  
 AST (SGOT) 35 15 - 37 U/L Alk. phosphatase 118 50 - 136 U/L Protein, total 4.9 (L) 6.3 - 8.2 g/dL Albumin 2.0 (L) 3.2 - 4.6 g/dL Globulin 2.9 2.3 - 3.5 g/dL A-G Ratio 0.7 (L) 1.2 - 3.5 MAGNESIUM Collection Time: 11/01/20  5:59 AM  
Result Value Ref Range Magnesium 1.8 1.8 - 2.4 mg/dL PLEASE READ & DOCUMENT PPD TEST IN 72 HRS Collection Time: 11/01/20 11:51 AM  
Result Value Ref Range PPD Negative Negative  
 mm Induration 0 0 - 5 mm All Micro Results None Current Meds: 
Current Facility-Administered Medications Medication Dose Route Frequency  magnesium sulfate 4 g/100 mL IVPB  4 g IntraVENous ONCE  potassium chloride 20 mEq in 100 ml IVPB  20 mEq IntraVENous Q2H  
 gabapentin (NEURONTIN) capsule 300 mg  300 mg Oral TID  mirtazapine (REMERON) tablet 15 mg  15 mg Oral QHS  magnesium oxide (MAG-OX) tablet 400 mg  400 mg Oral BID  sucralfate (CARAFATE) tablet 1 g  1 g Oral AC&HS  
 Lactobacillus Acidoph & Bulgar [facility]) tablet 2 Tab  2 Tab Oral BID  chlorhexidine (PERIDEX) 0.12 % mouthwash 15 mL  15 mL Oral Q12H  diphenhydrAMINE (BENADRYL) capsule 25 mg  25 mg Oral Q6H PRN  
 multivitamin, stress formula (STRESS TAB) tablet 1 Tab  1 Tab Oral DAILY  thiamine HCL (B-1) tablet 100 mg  100 mg Oral DAILY  budesonide-formoteroL (SYMBICORT) 160-4.5 mcg/actuation HFA inhaler 2 Puff  2 Puff Inhalation BID RT  
 diazePAM (VALIUM) tablet 2 mg  2 mg Oral Q12H PRN  pantoprazole (PROTONIX) tablet 40 mg  40 mg Oral ACB  tiotropium bromide (SPIRIVA RESPIMAT) 2.5 mcg /actuation  2 Puff Inhalation DAILY  sodium chloride (NS) flush 5-40 mL  5-40 mL IntraVENous Q8H  
 sodium chloride (NS) flush 5-40 mL  5-40 mL IntraVENous PRN  
 acetaminophen (TYLENOL) tablet 650 mg  650 mg Oral Q6H PRN  Or  
 acetaminophen (TYLENOL) suppository 650 mg  650 mg Rectal Q6H PRN  
  polyethylene glycol (MIRALAX) packet 17 g  17 g Oral DAILY PRN  promethazine (PHENERGAN) tablet 12.5 mg  12.5 mg Oral Q6H PRN Or  
 ondansetron (ZOFRAN) injection 4 mg  4 mg IntraVENous Q6H PRN  
 enoxaparin (LOVENOX) injection 40 mg  40 mg SubCUTAneous DAILY  ibuprofen (MOTRIN) tablet 600 mg  600 mg Oral Q6H PRN Diet: DIET REGULAR 
DIET NUTRITIONAL SUPPLEMENTS Other Studies (last 24 hours): No results found. Assessment and Plan:  
 
Hospital Problems as of 11/1/2020 Date Reviewed: 7/24/2017 Codes Class Noted - Resolved POA Peripheral neuropathy ICD-10-CM: G62.9 ICD-9-CM: 356.9  10/31/2020 - Present Unknown Hypomagnesemia ICD-10-CM: R34.23 
ICD-9-CM: 275.2  10/31/2020 - Present Unknown MARIBELL (acute kidney injury) ([facility] ICD-10-CM: N17.9 ICD-9-CM: 584.9  10/31/2020 - Present Unknown Cachexia ([facility] ICD-10-CM: R64 
ICD-9-CM: 799.4  10/28/2020 - Present Yes Severe protein-calorie malnutrition ([facility] ICD-10-CM: K17 ICD-9-CM: 293  10/28/2020 - Present Yes Hypokalemia ICD-10-CM: E87.6 ICD-9-CM: 276.8  10/28/2020 - Present Yes * (Principal) Severe protein-calorie malnutrition [place]: less than 60% of standard weight) ([facility] ICD-10-CM: U48 ICD-9-CM: 921  10/28/2020 - Present Unknown Weight loss ICD-10-CM: R63.4 ICD-9-CM: 783.21  1/15/2020 - Present Yes COPD (chronic obstructive pulmonary disease) ([facility]) ICD-10-CM: J44.9 ICD-9-CM: 824  4/9/2015 - Present Yes Hypertension ICD-10-CM: I10 
ICD-9-CM: 401.9  4/9/2015 - Present Yes A/P:   
Profound hypokalemiacorrected. Associated hypomagnesemiaas suspected recurrent serum hypomagnesemia suggesting total body depletion. --Continue aggressive IV and oral supplementation magnesium until comfortably greater than 2 and follow closely. Additional potassium. maribell on admit--resolvedlikely volume depletion. Failure to thrive, weaknessrefusing physical Occupational Therapywill reconsider if effects status. Goal discharge to independent living eventually. Anorexia, failure to thrive Possible occult depressionformer alcohol abuse quit 1 month ago. Tolerating current medications but complaining about oral meds. Titrate Remeronappetite still poor. 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 Lower extremity dermal findings improving with nutritional supplementation/vitamin/mineral supplementation. He does  have a history of hepatitis C. 
 
Very poor dentitionwill need outpatient dental referral remained stable. Continue Peridexthis is unchanged Depressed affectdenies suicidal thoughts or ideationscontinue Remeron for appetite stimulation and most definitely malnutrition is contributing to current mental status. He absolutely denies any suicidal thoughts or ideations to me. Comorbidities as above include COPD, hepatitis C, alcohol abusenone in 1 month, hypertension, failure to thrive, weight loss/cachexia Discharge disposition pending placementhe currently has no homeson will not accept him back to prior situation DC planning/Dispo: Home with home health DVT ppx: SCDs Code status: DNR Medical decision maker: Self, family contacts on file Signed: 
Joel GUTIERREZ

## 2020-11-01 NOTE — PROGRESS NOTES
Problem: Falls - Risk of 
Goal: *Absence of Falls Description: Document Edmonia Morning Fall Risk and appropriate interventions in the flowsheet. Outcome: Progressing Towards Goal 
Note: Fall Risk Interventions: 
Mobility Interventions: Communicate number of staff needed for ambulation/transfer Medication Interventions: Assess postural VS orthostatic hypotension Elimination Interventions: Call light in reach, Patient to call for help with toileting needs History of Falls Interventions: Consult care management for discharge planning Problem: Patient Education: Go to Patient Education Activity Goal: Patient/Family Education Outcome: Progressing Towards Goal 
  
Problem: Pressure Injury - Risk of 
Goal: *Prevention of pressure injury Description: Document David Scale and appropriate interventions in the flowsheet. Outcome: Progressing Towards Goal 
Note: Pressure Injury Interventions: 
Sensory Interventions: Assess changes in LOC Moisture Interventions: Absorbent underpads Activity Interventions: Increase time out of bed Mobility Interventions: HOB 30 degrees or less Nutrition Interventions: Document food/fluid/supplement intake Problem: Patient Education: Go to Patient Education Activity Goal: Patient/Family Education Outcome: Progressing Towards Goal

## 2020-11-01 NOTE — PROGRESS NOTES
GISELA spoke with the patient's son who requested to email him the SNF list. GISELA Emailed the list to [email or web address] . CM remains available to assist with referrals after we get choices back.

## 2020-11-01 NOTE — PROGRESS NOTES
Dr. Leandro Hugehs called for update on patient for psych evaluation. Monitor set up in patient's room and ready for video call.

## 2020-11-01 NOTE — PROGRESS NOTES
11/1/2020 1603 by Terry Pope RN Outcome: Progressing Towards Goal 
Note: Fall Risk Interventions: 
Mobility Interventions: Communicate number of staff needed for ambulation/transfer Medication Interventions: Assess postural VS orthostatic hypotension Elimination Interventions: Call light in reach, Patient to call for help with toileting needs History of Falls Interventions: Consult care management for discharge planning Problem: Patient Education: Go to Patient Education Activity Goal: Patient/Family Education 11/1/2020 1603 by Terry Pope RN Outcome: Progressing Towards Goal 
  
Problem: Pressure Injury - Risk of 
Goal: *Prevention of pressure injury Description: Document David Scale and appropriate interventions in the flowsheet. 11/1/2020 1603 by Terry Pope RN Outcome: Progressing Towards Goal 
Note: Pressure Injury Interventions: 
Sensory Interventions: Assess changes in LOC Moisture Interventions: Absorbent underpads Activity Interventions: Increase time out of bed Mobility Interventions: HOB 30 degrees or less Nutrition Interventions: Document food/fluid/supplement intake Problem: Nutrition Deficit Goal: *Optimize nutritional status 11/1/2020 1604 by Terry Pope RN Outcome: Not Progressing Towards Goal 
  
Problem: Patient Education: Go to Patient Education Activity Goal: Patient/Family Education 11/1/2020 1604 by Terry Pope RN Outcome: Progressing Towards Goal

## 2020-11-01 NOTE — PROGRESS NOTES
Hourly rounds performed. All needs met. Bed is in low position and call light is within reach. Pt complained of pain this AM. Medicated per MAR. Will continue to monitor and report to oncoming nurse.

## 2020-11-01 NOTE — PROGRESS NOTES
Hourly rounds performed. All needs meet. Appetite fair. Prefers supplements, Ensure. Voiding yellow,clear in urinal. 
 
Bed low/locked. Call light within reach. Patient denies needs at this time. Will continue to monitor and report to oncoming RN.

## 2020-11-01 NOTE — PROGRESS NOTES
Problem: Nutrition Deficit Goal: *Optimize nutritional status Outcome: Not Progressing Towards Goal

## 2020-11-02 NOTE — INTERDISCIPLINARY ROUNDS
Interdisciplinary team rounds were held 11/2/2020 with the following team members:Care Management, Nursing, Occupational Therapy and Physician and the patient and family. Plan of care discussed. See clinical pathway and/or care plan for interventions and desired outcomes. Pt refusing PT/OT but needs re-eval.  
Psych consult was completed and Pt deemed competent to make decisions. Pt will need STR on discharge.

## 2020-11-02 NOTE — PROGRESS NOTES
Explained to pt that he has some breakdown on his sacrum area and need to keep turned. However, pt did not participate in turning self, nor did he stay turned when attempted to keep turned throughout the shift.

## 2020-11-02 NOTE — PROGRESS NOTES
Hospitalist Progress Note Admit Date:  10/27/2020 10:06 PM  
Name:  Rosi Eric Age:  61 y.o. 
:  1960 MRN:  [id number] PCP:  Iza Murray MD 
Treatment Team: Attending Provider: Edda Montemayor DO; Utilization Review: Marthenia Hearing; Care Manager: Rafy Fishman RN; Consulting Provider: Nicole Cooper MD 
 
Subjective: HPI and or CC: Copied from history and physical HPIadmit : 
Patient is a 63yo M with a history of COPD, HCV, HLD, tobacco abuse, and alcohol abuse who presents with weakness and decreased intake for three weeks. He was made to come in by his son. He has been in generally poor health for years but acute decline in the last month. He quit smoking and drinking (former 12 beers most days) in the same timeline as well. Did not have obvious withdrawal symptoms. Has been drinking water and sometimes juice, hasn't eaten any solid food in three weeks. No appetite, some vague abdominal pain, not currently. Was previously thin, obviously losing weight quickly now. Found in the ER to have severe hypokalemia.  
  
Prior workup for weight loss included CT chest abdomen pelvis 19 without obvious malignancy, with stable pulm nodules, gastric wall thickening concerning for gastritis. Had planned EGD and colonoscopy, but refused. Is past due for repeat CT chest cancer screening. 2020: 
Chief complaintprofound weaknessrefusing oral potassium Profound hypokalemia with serum potassium 1.9. Serum magnesium reported at greater than 2 but prior alcohol abuse and poor oral intake. Supplemented with magnesium and found out that he refused at least one of his morning doses of aggressive potassium replacement and will not take any potassium by mouth. Offered effervescent/elixir and he refuses. He wants potassium IV. Will need to administer 20 mg every 2 hours frequently.   He will likely need at least 10 doses and probably more given serum potassium level significantly below 3. Further assessment about functional status, weakness to be determined after replacement of significant electrolyte abnormality. He likely has significant total and protein calorie malnutrition and nothing solid to eat for 3 weeks. Previous work-up for possible malignancy included a June 2019 CT scan which did show gastric wall thickening concerning for possible gastritis. Refused EGD and colonoscopy and will continue to refuse by his report today. May need to consider discussion regarding end-of-life care but I am going to reassess once to correct his electrolyte abnormality. No recent TSH. No alcohol in over 1 month per patient October 30, 2020: 
Chief complaintweakness malaise poor appetite Significantly improved today with near correction of hypokalemia which was profound. Magnesium 1.9 and potassium 3.7. Receiving nutritional support with Ensure and appetite remains poor. [place] has improved today. We discussed options for appetite stimulation including Marinol and mirtazapine and he elects to gain \"avoid the marijuana\" despite my appropriate description and will agree to mirtazapine. Supplement additional magnesium until level consistently above 2 given his prior alcohol abuse and likely significant total body magnesium depletion. October 31, 2020: 
Chief complaintfoot lower extremity burning pain, poor appetite and poor oral intake Oral intake improving some since addition of Remeron, Carafate to PPI. Probiotic. Severe malnutrition and poor oral intake with depressed affect. Peripheral sensory neuropathy both mental status and neuropathy very likely nutritional component if not causative. Continue vitamin mineral calorie and protein/macronutrient support. Refusing PT and OT claims he would refuse home health.   Potassium corrected magnesium still low confirming suspected total body depletionformer alcoholic quit 1 month ago. Denies urge to drink alcohol. November 1 2020: 
Chief complaintpoor appetite, lower extremity pain and causalgia Has a history of neuropathylikely alcoholic. Was using gabapentin nocturnally only. Appetite appears to be minimally improved after several nights of mirtazapine, receiving empiric treatment for gastritis with Carafate and PPI. We discussed psychiatric consultation regarding depressed affect and as importantcompetency determination. Patient's son came to the nursing station last night very agitated claiming that he would not allow his father to come back and live with him because he did not want him \"dying on his couch\" he has legitimate concerns about patient's mental status, oral intake and overall health. I agree patient unlikely to progress if returns to prior situation without healthcare intervention and he claims he would refuse home health and he absolutely refuses placement. I discussed situation with patient and informed him he may need to be placed for rehabilitation at least short-term with goal of either independent living or return to live with son. He claims he spoke with a friend who is looking into getting him a trailer that he could live in and would agree to home health in his trailer if this is arranged. I explained this is not ideal.  Overall patient is more cooperative and rational after correction of profound hypokalemia and nutritional support. Encouraged him as his lower extremities erythema and dermal findings appear to be improving/resolving. Discussed titrate gabapentin and physical therapy evaluation and treatment with short-term rehab recommended regarding at least moderate likely severe total and protein calorie malnutritionawaiting nutritionist impression and recommendations. Patient agreeable to psychiatric and nutritional consultations November 2, 2020: 
Chief complaintpoor oral intake, weakness Telemetry psychiatry confirmed patient has decisional capability. Son will not allow him to come back and live with him. Patient initially refused working with physical therapy. He has severe malnutrition, failure to thrive and general debility. 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 He is now agreeable to work with physical therapy as this is necessary. He is not stable enough to be discharged to a shelter and likely needs physical and occupational therapy nutritional support and possible further medical intervention. Today for the first day his magnesium and potassium appear stable but need to monitor closely. He is tolerating Remeron and appetite slowly improving. Distal lower extremity erythema which was significant is dramatically improved. Objective:  
 
Patient Vitals for the past 24 hrs: 
 Temp Pulse Resp BP SpO2  
11/02/20 0830 98.4 °F (36.9 °C) 93 19 110/60 98 % 11/02/20 0807     95 % 11/02/20 0404 98.4 °F (36.9 °C) 84 19 (!) 90/52 95 % 11/01/20 2338 99 °F (37.2 °C) 89  104/60 95 % 11/01/20 2138     99 % 11/01/20 1853 98.9 °F (37.2 °C) 83  124/65 100 % 11/01/20 1626 98.5 °F (36.9 °C) 90 18 (!) 91/50 98 % 11/01/20 1145 98.7 °F (37.1 °C) 87 18 (!) 99/58 97 % Oxygen Therapy O2 Sat (%): 98 % (11/02/20 0830) Pulse via Oximetry: 87 beats per minute (11/02/20 0807) O2 Device: Room air (11/02/20 0830) Intake/Output Summary (Last 24 hours) at 11/2/2020 1117 Last data filed at 11/2/2020 1038 Gross per 24 hour Intake  Output 1850 ml Net -1850 ml REVIEW OF SYSTEMS: Comprehensive ROS performed and negative except as stated in HPI. Physical Examination: 
Physical Exam 
Vitals signs and nursing note reviewed. Constitutional:   
   General: He is not in acute distress. Appearance: He is ill-appearing. He is not toxic-appearing or diaphoretic. Comments: cachectic HENT:  
 Head: Normocephalic and atraumatic. Nose: Nose normal. No congestion or rhinorrhea. Mouth/Throat:  
   Mouth: Mucous membranes are dry. Pharynx: Oropharynx is clear. No oropharyngeal exudate or posterior oropharyngeal erythema. Comments: Poor dentition Eyes:  
   General: No scleral icterus. Extraocular Movements: Extraocular movements intact. Conjunctiva/sclera: Conjunctivae normal.  
   Pupils: Pupils are equal, round, and reactive to light. Neck: Musculoskeletal: Neck supple. No neck rigidity or muscular tenderness. Cardiovascular:  
   Rate and Rhythm: Normal rate and regular rhythm. Heart sounds: No friction rub. No gallop. Pulmonary:  
   Effort: No respiratory distress. Breath sounds: No wheezing, rhonchi or rales. Abdominal:  
   General: Bowel sounds are normal. There is no distension. Tenderness: There is no abdominal tenderness. There is no guarding. Musculoskeletal: Normal range of motion. General: No signs of injury. Right lower leg: No edema. Left lower leg: No edema. Comments: Significant erythema distal lower extremities-continues to improve Skin: 
   General: Skin is dry. Capillary Refill: Capillary refill takes 2 to 3 seconds. Coloration: Skin is not jaundiced. Findings: No rash. Comments: Dermal flaking lower ext Neurological:  
   General: No focal deficit present. Mental Status: He is alert and oriented to person, place, and time. Cranial Nerves: No cranial nerve deficit. Motor: No weakness. Psychiatric:     
   Mood and Affect: Mood normal.     
   Behavior: Behavior normal.     
   Judgment: Judgment normal.  
   Comments: More alert, more cooperative and outlook/affect appears improved today Data Review: 
I have reviewed all labs, meds, telemetry events, and studies from the last 24 hours. Recent Results (from the past 24 hour(s)) PLEASE READ & DOCUMENT PPD TEST IN 72 HRS Collection Time: 11/01/20 11:51 AM  
Result Value Ref Range PPD Negative Negative  
 mm Induration 0 0 - 5 mm MAGNESIUM Collection Time: 11/02/20  5:22 AM  
Result Value Ref Range Magnesium 2.2 1.8 - 2.4 mg/dL METABOLIC PANEL, BASIC Collection Time: 11/02/20  5:22 AM  
Result Value Ref Range Sodium 138 136 - 145 mmol/L Potassium 4.0 3.5 - 5.1 mmol/L Chloride 105 98 - 107 mmol/L  
 CO2 27 21 - 32 mmol/L Anion gap 6 (L) 7 - 16 mmol/L Glucose 98 65 - 100 mg/dL BUN 13 8 - 23 MG/DL Creatinine 0.55 (L) 0.8 - 1.5 MG/DL  
 GFR est AA >60 >60 ml/min/1.73m2 GFR est non-AA >60 >60 ml/min/1.73m2 Calcium 7.9 (L) 8.3 - 10.4 MG/DL All Micro Results None Current Meds: 
Current Facility-Administered Medications Medication Dose Route Frequency  traMADoL (ULTRAM) tablet 50 mg  50 mg Oral Q6H PRN  polyethylene glycol (MIRALAX) packet 17 g  17 g Oral TID PRN  
 gabapentin (NEURONTIN) capsule 300 mg  300 mg Oral TID  mirtazapine (REMERON) tablet 15 mg  15 mg Oral QHS  magnesium oxide (MAG-OX) tablet 400 mg  400 mg Oral BID  sucralfate (CARAFATE) tablet 1 g  1 g Oral AC&HS  
 Lactobacillus Acidoph & Bulgar [facility]) tablet 2 Tab  2 Tab Oral BID  chlorhexidine (PERIDEX) 0.12 % mouthwash 15 mL  15 mL Oral Q12H  diphenhydrAMINE (BENADRYL) capsule 25 mg  25 mg Oral Q6H PRN  
 multivitamin, stress formula (STRESS TAB) tablet 1 Tab  1 Tab Oral DAILY  thiamine HCL (B-1) tablet 100 mg  100 mg Oral DAILY  budesonide-formoteroL (SYMBICORT) 160-4.5 mcg/actuation HFA inhaler 2 Puff  2 Puff Inhalation BID RT  
 diazePAM (VALIUM) tablet 2 mg  2 mg Oral Q12H PRN  pantoprazole (PROTONIX) tablet 40 mg  40 mg Oral ACB  tiotropium bromide (SPIRIVA RESPIMAT) 2.5 mcg /actuation  2 Puff Inhalation DAILY  sodium chloride (NS) flush 5-40 mL  5-40 mL IntraVENous Q8H  
  sodium chloride (NS) flush 5-40 mL  5-40 mL IntraVENous PRN  
 acetaminophen (TYLENOL) tablet 650 mg  650 mg Oral Q6H PRN Or  
 acetaminophen (TYLENOL) suppository 650 mg  650 mg Rectal Q6H PRN  polyethylene glycol (MIRALAX) packet 17 g  17 g Oral DAILY PRN  promethazine (PHENERGAN) tablet 12.5 mg  12.5 mg Oral Q6H PRN Or  
 ondansetron (ZOFRAN) injection 4 mg  4 mg IntraVENous Q6H PRN  
 enoxaparin (LOVENOX) injection 40 mg  40 mg SubCUTAneous DAILY  ibuprofen (MOTRIN) tablet 600 mg  600 mg Oral Q6H PRN Diet: DIET REGULAR 
DIET NUTRITIONAL SUPPLEMENTS Other Studies (last 24 hours): No results found. Assessment and Plan:  
 
Hospital Problems as of 11/2/2020 Date Reviewed: 7/24/2017 Codes Class Noted - Resolved POA Peripheral neuropathy ICD-10-CM: G62.9 ICD-9-CM: 356.9  10/31/2020 - Present Unknown Hypomagnesemia ICD-10-CM: M72.71 
ICD-9-CM: 275.2  10/31/2020 - Present Unknown MARIBELL (acute kidney injury) ([facility] ICD-10-CM: N17.9 ICD-9-CM: 584.9  10/31/2020 - Present Unknown Cachexia ([facility] ICD-10-CM: R64 
ICD-9-CM: 799.4  10/28/2020 - Present Yes Severe protein-calorie malnutrition ([facility] ICD-10-CM: G58 ICD-9-CM: 597  10/28/2020 - Present Yes Hypokalemia ICD-10-CM: E87.6 ICD-9-CM: 276.8  10/28/2020 - Present Yes * (Principal) Severe protein-calorie malnutrition Yaya Nataliya: less than 60% of standard weight) ([facility] ICD-10-CM: N80 ICD-9-CM: 776  10/28/2020 - Present Unknown Weight loss ICD-10-CM: R63.4 ICD-9-CM: 783.21  1/15/2020 - Present Yes COPD (chronic obstructive pulmonary disease) (HCC) ICD-10-CM: J44.9 ICD-9-CM: 780  4/9/2015 - Present Yes Hypertension ICD-10-CM: I10 
ICD-9-CM: 401.9  4/9/2015 - Present Yes A/P:   
Profound hypokalemiacorrected. Associated hypomagnesemiaas suspected recurrent serum hypomagnesemia suggesting total body depletion. --Monitor closelycontinue aggressive magnesium supporttotal body magnesium depleted from chronic alcohol abuse. bib on admit--resolvedlikely volume depletion. This is unchanged. Failure to thrive, weaknessinitially refused PT and OT eval's and is now agreeable. Goal discharge to independent living eventually. Anorexia, failure to thrive Possible occult depressionformer alcohol abuse quit 1 month ago. Tolerating increased Remeron and affect improving and oral intake improving. Needs nutritional support, close monitoring, physical and occupational therapynow agreeable for evaluation. 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 Lower extremity dermal findings improving with nutritional supplementation/vitamin/mineral supplementation. He does  have a history of hepatitis C. 
 
Very poor dentitiontelemetry psychiatry was aware of 2 providers in [facility] which will provide free or markedly reduced price dental care. He likely needs complete extractions and this should improve his overall oral intake in the future once this is able to be accomplished. Will need referral when discharged. Depressed affectdenies suicidal thoughts or ideationscontinue Remeron. Telemetry psych consult reviewed and no further psychiatric medication recommendations. Comorbidities as above include COPD, hepatitis C, alcohol abusenone in 1 month, hypertension, failure to thrive, weight loss/cachexia Discharge disposition pending placementhe currently has no homeson will not accept him back to prior situation DC planning/Dispo: Home with home health DVT ppx: SCDs Code status: DNR Medical decision maker: Self, family contacts on file Signed: 
Joel GUTIERREZ

## 2020-11-02 NOTE — PROGRESS NOTES
Hourly rounds performed. All needs met. Bed is in low position and call light is within reach. Pt had no complaints during shift. Pt had 1 BM. Will continue to monitor and report to oncoming nurse.

## 2020-11-02 NOTE — PROGRESS NOTES
Pt is noted to have an abrasion/broken skin on bony prominences of sacrum. Will encourage pt to turn.

## 2020-11-02 NOTE — PROGRESS NOTES
Comprehensive Nutrition Assessment Type and Reason for Visit: Shelly Lara Nutrition Recommendations/Plan:  
Continue current diet. Potentially could benefit from a diet texture down grade related to chewing however this would limit some of the foods he selects at present and pt declines. Continue Ensure Enlive TID. Ad [employer] Nutrition Assessment:  PMH of COPD, HCV, HLD, tobacco abuse, ETOH abuse. PAst workup for wt loss included CT chest abdomen pelvis 6/5/19 without obvious malignancy, stable pulmonary nodules, gastric wall thickening concerning for gastritis he refused follow-up EGD and colonoscopy. Admitted with hypokalemia, FTT, poor oral intake, wt loss, cachexia. Estimated Daily Nutrient Needs: 
Energy (kcal): 8982-6350(65-78 kcal/kg); Weight Used for Energy Requirements: Admission Protein (g): 55-68(1.2-1.5 g/kg); Weight Used for Protein Requirements: Admission Fluid (ml/day): 8413-9165; Weight Used for Fluid Requirements: Admission Nutrition Related Findings:      
Pt sitting in be at RD visit. He complains of pain when hewing related to poor detentio, he then goes on to tell me Dr. Alfredo Charles has found 2 dentist he can work with once he discharges. He indicates he is now requesting items from the patient dining associate. He ordered cereal and banana for noon meal.  He consumes the ensure and is agreeable to addition of magic cup. He declines texture change on diet. Pertinent Medications: floranex, mg ox, remeron, MVI stress tab, protonix, carafate, thiamine, miralax. Pertinent Labs: Na 138, K , glu 98, Cr 0.55, Mg 2.2 Current Nutrition Therapies: DIET REGULAR 
DIET NUTRITIONAL SUPPLEMENTS All Meals; Ensure Enlive ( ) Anthropometric Measures: 
· Height:  6' (182.9 cm) · Current Body Wt:  45.4 kg (100 lb 1.4 oz) · Admission Body Wt:  100 lb 1.4 oz · Usual Body Wt:  50.8 kg (112 lb)(per EMR review wt from 1/15/2020) · Ideal Body Wt:  178 lbs:  56.2 % Nutrition Diagnosis: · Severe malnutrition, In context of acute illness or injury related to (refusing to eat) as evidenced by severe loss of subcutaneous fat, severe muscle loss(po recall <15% estimated needs) Inadequate oral intake related to chewing difficulty and anorexia as evidenced by c/o inability to chew, po intake primarily oral supplement with current po potentially meeting ~50% estimated needs at this time. Nutrition Interventions:  
Food and/or Nutrient Delivery: Continue current diet, Modify oral nutrition supplement Coordination of Nutrition Care:   Plan of Care Discussed with DEVI Hoffman. Goals: 
Meet > 75% estimated needs orally Nutrition Monitoring and Evaluation:  
Behavioral-Environmental Outcomes: Readiness for change Food/Nutrient Intake Outcomes: Food and nutrient intake, Supplement intake Physical Signs/Symptoms Outcomes: Biochemical data, Chewing or swallowing Discharge Planning:   
Continue oral nutrition supplement Chino Godfrey RD, LDN on 11/2/2020 at 4:05 PM 
Contact: [phone]

## 2020-11-02 NOTE — PROGRESS NOTES
Chart screened by  for discharge planning. Patient continues to refuse PT/OT therefore they have signed off per patient request. Patient will be unable to discharge to STR due to no therapy notes. CM will continue to follow patient during hospitalization for discharge planning and needs. Please consult or notify  if any new issues arise.

## 2020-11-03 PROBLEM — F10.10 ALCOHOL ABUSE: Status: ACTIVE | Noted: 2020-01-01

## 2020-11-03 PROBLEM — D64.9 NORMOCYTIC ANEMIA: Status: ACTIVE | Noted: 2020-01-01

## 2020-11-03 PROBLEM — F32.A DEPRESSION: Status: ACTIVE | Noted: 2020-01-01

## 2020-11-03 NOTE — PROGRESS NOTES
Hospitalist Progress Note Patient: Sergei Aranda MRN: [id number]  SSN: [id number] YOB: 1960  Age: 61 y.o. Sex: male Admit Date: 10/27/2020 LOS: 6 days Subjective:  
 
61year old CM with a PMH of COPD, HCV, HLD, tobacco abuse, and alcohol abuse admitted due to a month of progressive weakness and decreased intake for three weeks, who was found to have profound hypokalemia and hypomagnesemia. He also has peripheral neuropathy. He was living with his son, who apparently refuses to take him back so difficult dispo. 11/3 - He feels improved today. Still with extreme neuropathy, but improving. Eating better. Doesn't feel as depressed. Denies CP/SOB. Denies F/C/N/V. Review of systems negative except stated above. Objective:  
 
Visit Vitals /60 (BP 1 Location: Left arm, BP Patient Position: At rest) Pulse 71 Temp 97.4 °F (36.3 °C) Resp 16 Ht 6' (1.829 m) Wt 45.4 kg (100 lb) SpO2 99% BMI 13.56 kg/m² Oxygen Therapy O2 Sat (%): 99 % (11/03/20 0733) Pulse via Oximetry: 70 beats per minute (11/03/20 0710) O2 Device: Room air (11/03/20 0710) Intake and Output:  
 
Intake/Output Summary (Last 24 hours) at 11/3/2020 1027 Last data filed at 11/3/2020 2100 Gross per 24 hour Intake  Output 2170 ml Net -2170 ml Physical Exam:  
GENERAL: alert, cooperative, no distress, appears stated age EYE: conjunctivae/corneas clear. PERRL. THROAT & NECK: normal and no erythema or exudates noted. LUNG: diminished, no wheezing HEART: regular rate and rhythm, S1S2, no murmur, no JVD ABDOMEN: soft, non-tender, non-distended. Bowel sounds normal.  
EXTREMITIES:  No edema, 2+ pedal/radial pulses bilaterally SKIN: no rash or abnormalities NEUROLOGIC: A&Ox3. Cranial nerves 2-12 grossly intact. Lab/Data Review: No results found for this or any previous visit (from the past 24 hour(s)). SARS-CoV-2 Lab Results \"Novel Coronavirus\" Test: No results found for: COV2NT \"Emergent Disease\" Test: No results found for: EDPR \"SARS-COV-2\" Test: No results found for: XGCOVT \"[employer]\" Test: No results found for: RSLT Rapid Test: No results found for: COVR Imaging: Xr Chest [facility] Result Date: 10/27/2020 EXAM: Chest x-ray. INDICATION: Cough and dyspnea. COMPARISON: Prior CT chest on July 1, 2019. TECHNIQUE: Frontal view chest x-ray. FINDINGS: The lungs are hyperexpanded, consistent with underlying emphysema. No acute infiltrate or pulmonary edema is seen. The cardiac size, mediastinal contour and pulmonary vasculature are normal. No pneumothorax or pleural effusion is seen. IMPRESSION: 1. No acute process. 2. Emphysema. No results found for this visit on 10/27/20. Cultures: All Micro Results None Assessment/Plan:  
 
Principal Problem: Hypokalemia (10/28/2020) - Resolved - Due to decreased PO intake + ETOH abuse - Continue MVI 
- Check BMP daily Active Problems: Hypomagnesemia (10/31/2020) - Resolved - Due to decreased PO intake + ETOH abuse - Continue MVI 
- Continue Mag-Ox BID 
- Check Mag daily MARIBELL (acute kidney injury) ([address] (10/31/2020) - Due to dehydration - Resolved Hypertension (4/9/2015) - Stable Peripheral neuropathy (10/31/2020) - Due to ETOH abuse - Continue Gabapentin Normocytic anemia (11/3/2020) - Stable COPD (chronic obstructive pulmonary disease) ([address] (4/9/2015) - No acute issues - Continue aerosols Weight loss (1/15/2020) - Due to ETOH abuse and depression 
- Continue Mirtazapine Cachexia ([address] (10/28/2020) - Due to COPD, ETOH abuse and depression 
- Continue Mirtazapine Depression (11/3/2020) - Continue Mirtazapine Chronic hepatitis C virus infection ([address] () Severe protein-calorie malnutrition Mavis [place]: less than 60% of standard weight) ([address] (10/28/2020) - Continue supplements Alcohol abuse (11/3/2020) Today's Plan: PT/OT. Continue current plan. DIET REGULAR 
DIET NUTRITIONAL SUPPLEMENTS All Meals; Ensure Enlive ( ) DIET NUTRITIONAL SUPPLEMENTS Lunch, Dinner; Ly ( ) DVT Prophylaxis: Lovenox Discharge Plan: TBD Signed By: Omayra Boyce,  November 3, 2020

## 2020-11-03 NOTE — PROGRESS NOTES
Problem: Mobility Impaired (Adult and Pediatric) Goal: *Therapy Goal (Edit Goal, Insert Text) Description: STG: 
(1.)Mr. Eyal Daigle will move from supine to sit and sit to supine , scoot up and down, and roll side to side in bed with SUPERVISION within 3 treatment day(s). (2.)Mr. Eyal Daigle will transfer from bed to chair and chair to bed with STAND BY ASSIST using the least restrictive device within 3 treatment day(s). (3.)Mr. Eyal Daigle will ambulate with CONTACT GUARD ASSISTANCE for 50+ feet with the least restrictive device within 3 treatment day(s). (4.)Mr. Eyal Daigle will perform standing static and dynamic balance activities x 15 minutes with CONTACT GUARD ASSIST to improve safety within 3 day(s). LTG: 
(1.)Mr. Eyal Daigle will move from supine to sit and sit to supine , scoot up and down, and roll side to side in bed with INDEPENDENT within 7 treatment day(s). (2.)Mr. Eyal Daigle will transfer from bed to chair and chair to bed with MODIFIED INDEPENDENCE using the least restrictive device within 7 treatment day(s). (3.)Mr. Eyal Daigle will ambulate with MODIFIED INDEPENDENCE for 100+ feet with the least restrictive device within 7 treatment day(s). (4.)Mr. Eyal Daigle will perform standing static and dynamic balance activities x 15 minutes with MODIFIED INDEPENDENCE to improve safety within 7 day(s). (5.)Mr. Eyal Daigle will ascend and descend 3+ stairs using 0-1 hand rail(s) with CONTACT GUARD ASSIST to improve functional mobility and safety within 7 day(s). ________________________________________________________________________________________________ Outcome: Progressing Towards Goal 
  
PHYSICAL THERAPY: Daily Note, Re-evaluation and AM 11/3/2020 INPATIENT: PT Visit Days : 1 Payor: [facility] MEDICARE / Plan: [place] [facility] MEDICARE HMO/PPO / Product Type: Managed Care Medicare /   
  
NAME/AGE/GENDER: Ashanti Delgadillo is a 61 y.o. male PRIMARY DIAGNOSIS: Hypokalemia [E87.6] Hypokalemia Hypokalemia ICD-10: Treatment Diagnosis:  
 · Other abnormalities of gait and mobility (R26.89) Precaution/Allergies: 
Bees [employer] allergenic extract] ASSESSMENT:  
 
Mr. Claudia Dean presents supine in bed and agreeable to physical therapy re-evaluation following therapy discharge per patient's request on 10/30/20. He is very cooperative this AM, reports he is feeling much better than he did on admission. He lives with his son in a single story home which patient reports he does not plan to return to at discharge. Patient states that he was unable to ambulate for 3 weeks prior to admission, so has not left the couch in 3 weeks. Prior to that, he was ambulating without assistance although does admit to ~2 falls per month. He reports he plans to get a camper to move into at discharge with a family member. He completed bed mobility with stand by assistance and reports lightheadedness in sitting. Noted BUE tremor at rest. He exhibits decreased sensation throughout BLE and \"pins and needles\" sensation which is not new for him. Treatment initiated to include sit to stand transfer, requiring moderate assist x2 and short distance ambulation with moderate assist x2. Narrow base of support noted with patient reaching for external support and exhibiting impulsivity and decreased safety awareness. He did not tolerate standing activity very long before needing to return to sitting. He was left sitting on edge of bed with OT attending. He admits that he is significantly weaker than his baseline and feels that a rehab stay would be beneficial for him prior to returning home. Patient has declined in functional mobility over the past weeks. Mr. Claudia Dean would benefit from skilled physical therapy (medically necessary) to address his deficits and maximize his function. If he continues to refuse to fully participate, we will discontinue skilled PT. This section established at most recent assessment PROBLEM LIST (Impairments causing functional limitations): 1. Decreased ADL/Functional Activities 2. Decreased Transfer Abilities 3. Decreased Ambulation Ability/Technique 4. Decreased Balance 5. Increased Pain 6. Decreased Activity Tolerance INTERVENTIONS PLANNED: (Benefits and precautions of physical therapy have been discussed with the patient.) 1. Balance Exercise 2. Bed Mobility 3. Gait Training 4. Therapeutic Activites 5. Therapeutic Exercise/Strengthening 6. Transfer Training 7. education TREATMENT PLAN: Frequency/Duration: 3 times a week for duration of hospital stay Rehabilitation Potential For Stated Goals: Fair REHAB RECOMMENDATIONS (at time of discharge pending progress):   
Placement: It is my opinion, based on this patient's performance to date, that Mr. Anderson Bush may benefit from intensive therapy at a [facility] after discharge due to the functional deficits listed above that are likely to improve with skilled rehabilitation and concerns that he/she may be unsafe to be unsupervised at home due to decreased activity tolerance, requiring more assist for ambulation and transfers. Equipment: ? Tbd----RW? ? HISTORY:  
History of Present Injury/Illness (Reason for Referral): 
Per MD note, \"Patient is a 65yo M with a history of COPD, HCV, HLD, tobacco abuse, and alcohol abuse who presents with weakness and decreased intake for three weeks. He was made to come in by his son. He has been in generally poor health for years but acute decline in the last month. He quit smoking and drinking (former 12 beers most days) in the same timeline as well. Did not have obvious withdrawal symptoms. Has been drinking water and sometimes juice, hasn't eaten any solid food in three weeks. No appetite, some vague abdominal pain, not currently. Was previously thin, obviously losing weight quickly now. Found in the ER to have severe hypokalemia. Prior workup for weight loss included CT chest abdomen pelvis 6/5/19 without obvious malignancy, with stable pulm nodules, gastric wall thickening concerning for gastritis. Had planned EGD and colonoscopy, but refused. Is past due for repeat CT chest cancer screening. \" 
Past Medical History/Comorbidities:  
Mr. Anjel Duarte  has a past medical history of COPD (chronic obstructive pulmonary disease) ([address] Hepatitis C, Hepatitis C antibody test positive (10/12/2016), Hyperlipidemia, Hypertension, Influenza, Pulmonary nodule, and Tobacco abuse (6/24/2015). Mr. Anjel Duarte  has no past surgical history on file. Social History/Living Environment:  
Home Environment: Private residence One/Two Story Residence: One story Living Alone: No 
Support Systems: Child(walter) Patient Expects to be Discharged to[de-identified] Private residence Current DME Used/Available at Home: Walker, rolling Prior Level of Function/Work/Activity: 
States he lives with his son. Patient states that he has been unable to ambulate for 3 weeks, so has not left the couch in 3 weeks. Number of Personal Factors/Comorbidities that affect the Plan of Care: 1-2: MODERATE COMPLEXITY EXAMINATION:  
Most Recent Physical Functioning:  
Gross Assessment: 
AROM: Generally decreased, functional 
Strength: Generally decreased, functional 
         
  
Posture: 
Posture (WDL): Exceptions to [facility] Posture Assessment: Forward head Balance: 
Sitting: Impaired Sitting - Static: Fair (occasional) Sitting - Dynamic: Prop sitting Standing: Impaired Standing - Static: Poor Standing - Dynamic : Poor Bed Mobility: 
Supine to Sit: Stand-by assistance Scooting: Contact guard assistance Wheelchair Mobility: 
  
Transfers: 
Sit to Stand: Assist x2; Moderate assistance Stand to Sit: Assist x2; Moderate assistance Gait: refused Base of Support: Center of gravity altered;Narrowed Speed/[place]: Slow;Shuffled;Pace decreased (<100 feet/min) Step Length: Right shortened;Left shortened Gait Abnormalities: Decreased step clearance; Path deviations; Shuffling gait;Trunk sway increased Distance (ft): 5 Feet (ft) Assistive Device: (hand held assist x2) Ambulation - Level of Assistance: Moderate assistance;Assist x2 Interventions: Safety awareness training;Manual cues; Verbal cues; Visual/Demos Body Structures Involved: 1. Metabolic 2. Muscles Body Functions Affected: 1. Sensory/Pain 2. Neuromusculoskeletal 
3. Movement Related Activities and Participation Affected: 1. Mobility 2. Self Care 3. Domestic Life Number of elements that affect the Plan of Care: 4+: HIGH COMPLEXITY CLINICAL PRESENTATION:  
Presentation: Evolving clinical presentation with changing clinical characteristics: MODERATE COMPLEXITY CLINICAL DECISION MAKIN[address] [place] [id number] AM-PAC 6 Clicks Basic Mobility Inpatient Short Form How much difficulty does the patient currently have. .. Unable A Lot A Little None 1. Turning over in bed (including adjusting bedclothes, sheets and blankets)? [] 1   [] 2   [x] 3   [] 4  
2. Sitting down on and standing up from a chair with arms ( e.g., wheelchair, bedside commode, etc.)   [] 1   [x] 2   [] 3   [] 4  
3. Moving from lying on back to sitting on the side of the bed? [] 1   [] 2   [x] 3   [] 4 How much help from another person does the patient currently need. .. Total A Lot A Little None 4. Moving to and from a bed to a chair (including a wheelchair)? [] 1   [x] 2   [] 3   [] 4  
5. Need to walk in hospital room? [] 1   [x] 2   [] 3   [] 4  
6. Climbing 3-5 steps with a railing? [] 1   [x] 2   [] 3   [] 4  
© , Trustees of [address] [place] [id number], under license to [employer]. All rights reserved Score:  Initial: 14 Most Recent: X (Date: -- ) Interpretation of Tool:  Represents activities that are increasingly more difficult (i.e. Bed mobility, Transfers, Gait). Medical Necessity:    
· Patient is expected to demonstrate progress in  
· strength, balance, and functional technique ·  to  
· increase independence with   and improve safety during all functional mobility · . Reason for Services/Other Comments: 
· Patient continues to require skilled intervention due to · medical complications and patient unable to attend/participate in therapy as expected · . Use of outcome tool(s) and clinical judgement create a POC that gives a: Questionable prediction of patient's progress: MODERATE COMPLEXITY  
  
 
 
 
TREATMENT:  
(In addition to Assessment/Re-Assessment sessions the following treatments were rendered) Pre-treatment Symptoms/Complaints:  none Pain: Initial:  
Pain Intensity 1: 5 Pain Location 1: Foot Pain Orientation 1: Right, Left  Post Session:  5 Gait Training (  8 minutes):  Gait training to improve and/or restore physical functioning as related to mobility, strength, balance and coordination. Ambulated 5 Feet (ft) with Moderate assistance;Assist x2 using a (hand held assist x2) and maximal Safety awareness training;Manual cues; Verbal cues; Visual/Demos related to their posture, balance and hand placement to promote proper body alignment, promote proper body posture and promote proper body mechanics. Braces/Orthotics/Lines/Etc:  
· O2 Device: Room air Treatment/Session Assessment:   
· Response to Treatment:  see above. · Interdisciplinary Collaboration:  
o Physical Therapist 
o Occupational Therapist 
o Registered Nurse · After treatment position/precautions:  
o Bed/Chair-wheels locked 
o Bed in low position 
o RN notified 
o sitting edge of bed with OT  
· Compliance with Program/Exercises: Will assess as treatment progresses · Recommendations/Intent for next treatment session: \"Next visit will focus on advancements to more challenging activities and reduction in assistance provided\". Total Treatment Duration: PT Patient Time In/Time Out Time In: 1020 Time Out: 1040 Manuel Bright PT, DPT

## 2020-11-03 NOTE — ADT AUTH CERT NOTES
Gabrielle Samaniego Adult-Extended Stay (11/2/2020) by Braulio Kong  
 
   
Review Status  Review Entered In Primary  11/3/2020 12:29   
   
Criteria Review REVIEW SUMMARY 
  
Patient: Steven Bautista Review Number: [id number] Review Status: In Primary 
  
Condition Specific: Yes 
  
Condition Level Of Care Code: ACUTE Condition Level Of Care Description: Acute 
  
  
OUTCOMES Outcome Type: Primary 
  
  
  
REVIEW DETAILS 
  
Service Date: 11/02/2020 Admit Date: 10/28/2020 Product: Gabrielle Samaniego Adult Subset: Extended Stay (Symptom or finding within 24h) 
  (Excludes PO medications unless noted) Select Level of Care, One: 
            [ ] ACUTE, >= One: 
                [ ] Electrolyte or mineral imbalance, One: 
                    [X] Non-responder, not clinically stable for discharge and does not meet partial responder criteria (refer for secondary review) 
                    ~--Admin, IQ Admin Admin on 11- 12:29 PM--~ Hospitalist Progress Note Chief complaint-poor oral intake, weakness Telemetry psychiatry confirmed patient has decisional capability. Son will not allow him to come back and live with him. Patient initially refused working with physical therapy. He has severe malnutrition, failure to thrive and general debility. 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 He is now agreeable to work with physical therapy as this is necessary. He is not stable enough to be discharged to a shelter and likely needs physical and occupational therapy nutritional support and possible further medical intervention. Today for the first day his magnesium and potassium appear stable but need to monitor closely. He is tolerating Remeron and appetite slowly improving.   Distal lower extremity erythema which was significant is dramatically improved. EXAM:   HENT:  
                       Head: Normocephalic and atraumatic. Nose: Nose normal. No congestion or rhinorrhea. Mouth/Throat:  
                       Mouth: Mucous membranes are dry. Pharynx: Oropharynx is clear. No oropharyngeal exudate or posterior oropharyngeal erythema. Comments: Poor dentition Musculoskeletal:   Comments: Significant erythema distal lower extremities-continues to improve Skin: 
                       General: Skin is dry. Capillary Refill: Capillary refill takes 2 to 3 seconds. Coloration: Skin is not jaundiced. Findings: No rash. Comments: Dermal flaking lower ext Psychiatric:    Comments: More alert, more cooperative and outlook/affect appears improved today T 99.8, /57, P 101, R 19, 02  SAT 98% RA 
                    CREAT 0.55, CA 7.9,  
                     
                    A/P:   
                    Profound hypokalemia-corrected. Associated hypomagnesemia-as suspected recurrent serum hypomagnesemia suggesting total body depletion. --Monitor closely-continue aggressive magnesium support-total body magnesium depleted from chronic alcohol abuse. 
                      bib on admit--resolved-likely volume depletion. This is unchanged. 
                      
                    Failure to thrive, weakness-initially refused PT and OT eval's and is now agreeable. Goal discharge to independent living eventually. 
                      
                    Anorexia, failure to thrive Possible occult depression-former alcohol abuse quit 1 month ago. Tolerating increased Remeron and affect improving and oral intake improving. Needs nutritional support, close monitoring, physical and occupational therapy-now agreeable for evaluation. 
                      
                    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 Lower extremity dermal findings improving with nutritional supplementation/vitamin/mineral supplementation. He does  have a history of hepatitis C. 
                      
                    Very poor dentition-telemetry psychiatry was aware of 2 providers in [facility] which will provide free or markedly reduced price dental care. He likely needs complete extractions and this should improve his overall oral intake in the future once this is able to be accomplished. Will need referral when discharged. 
                      
                    Depressed affect-denies suicidal thoughts or ideations-continue Remeron. Telemetry psych consult reviewed and no further psychiatric medication recommendations. 
                      
                    Comorbidities as above include COPD, hepatitis C, alcohol abuse-none in 1 month, hypertension, failure to thrive, weight loss/cachexia 
                      
                    Discharge disposition pending placement-he currently has no home-son will not accept him back to prior situation 
                      
                    DC planning/Dispo: Home with home health DVT ppx: SCDs SAME SCHED MEDS 
                     
                     
                     
  
Version: [employer]® 2019 [employer]  © 2019 [employer] and/or one of its subsidiaries. All Rights Reserved. CPT only © 2018 American Medical Association. All Rights Reserved.  
   
LOC:Acute Adult-Extended Stay (11/1/2020) by Hai Ryan  
 
   
Review Status  Review Entered In Primary  11/3/2020 12:21   
   
Criteria Review REVIEW SUMMARY 
  
Patient: Karishma Zhang Review Number: [id number] Review Status: In Primary 
  
Condition Specific: Yes 
  
Condition Level Of Care Code: ACUTE Condition Level Of Care Description: Acute 
  
  
OUTCOMES Outcome Type: Primary 
  
  
  
REVIEW DETAILS 
  
Service Date: 11/01/2020 Admit Date: 10/28/2020 Product: Elias Cerise Adult Subset: Extended Stay (Symptom or finding within 24h) 
  (Excludes PO medications unless noted) Select Level of Care, One: 
            [ ] ACUTE, >= One: 
                [ ] Electrolyte or mineral imbalance, One: 
                    [X] Non-responder, not clinically stable for discharge and does not meet partial responder criteria (refer for secondary review) 
                    ~--Admin, IQ Admin Admin on 11- 12:21 PM--~ Hospitalist Progress NoteHas a history of neuropathy-likely alcoholic. Was using gabapentin nocturnally only. Appetite appears to be minimally improved after several nights of mirtazapine, receiving empiric treatment for gastritis with Carafate and PPI. We discussed psychiatric consultation regarding depressed affect and as important-competency determination. Patient's son came to the nursing station last night very agitated claiming that he would not allow his father to come back and live with him because he did not want him \"dying on his couch\" he has legitimate concerns about patient's mental status, oral intake and overall health.   I agree patient unlikely to progress if returns to prior situation without healthcare intervention and he claims he would refuse home health and he absolutely refuses placement. I discussed situation with patient and informed him he may need to be placed for rehabilitation at least short-term with goal of either independent living or return to live with son. He claims he spoke with a friend who is looking into getting him a trailer that he could live in and would agree to home health in his trailer if this is arranged. I explained this is not ideal.  Overall patient is more cooperative and rational after correction of profound hypokalemia and nutritional support. Encouraged him as his lower extremities erythema and dermal findings appear to be improving/resolving. Discussed titrate gabapentin and physical therapy evaluation and treatment with short-term rehab recommended regarding at least moderate likely severe total and protein calorie malnutrition-awaiting nutritionist impression and recommendations. Patient agreeable to psychiatric and nutritional consultations Chief complaint-poor appetite, lower extremity pain and causalgia. EXAM:   Constitutional:      General: He is not in acute distress. Appearance: He is ill-appearing. Comments: cachectic   Musculoskeletal:  Comments: Significant erythema distal lower extremities-continues to improve. Skin:    General: Skin is dry. Capillary Refill: Capillary refill takes 2 to 3 seconds. Coloration: Skin is not jaundiced. Comments: Dermal flaking lower ext. Psychiatric:     
                       Mood and Affect: Mood normal.     Judgment: Judgment normal.     Comments: More alert, more cooperative and outlook/affect appears improved today T 98.7, BP 91/57, P 84, R 17 RBC 2.96, HGB 10.1,. HCT 27.6, , , SNION GAP 5, CREAT 0.55, CA 7.7, TP 4.9,  ALB 2.0, VIT B12 1083   
                    A/P:   
                    Profound hypokalemia-corrected. Associated hypomagnesemia-as suspected recurrent serum hypomagnesemia suggesting total body depletion. --Continue aggressive IV and oral supplementation magnesium until comfortably greater than 2 and follow closely. Additional potassium. 
                      bib on admit--resolved-likely volume depletion. 
                      
                    Failure to thrive, weakness-refusing physical Occupational Therapy-will reconsider if effects status. Goal discharge to independent living eventually. 
                      
                    Anorexia, failure to thrive Possible occult depression-former alcohol abuse quit 1 month ago. Tolerating current medications but complaining about oral meds. Titrate Remeron-appetite still poor. Improved from admission-continue same-await psychiatric consultations determination of competency and any further recommendations regarding affect/mood 
                      
                    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 Lower extremity dermal findings improving with nutritional supplementation/vitamin/mineral supplementation. He does  have a history of hepatitis C. 
                      
                    Very poor dentition-will need outpatient dental referral remained stable. Continue Peridex-this is unchanged 
                      
                    Depressed affect-denies suicidal thoughts or ideations-continue Remeron for appetite stimulation and most definitely malnutrition is contributing to current mental status.   He absolutely denies any suicidal thoughts or ideations to me. 
                      
 Comorbidities as above include COPD, hepatitis C, alcohol abuse-none in 1 month, hypertension, failure to thrive, weight loss/cachexia 
                      
                    Discharge disposition pending placement-he currently has no home-son will not accept him back to prior situation 
                      
                    DC planning/Dispo: Home with home health DVT ppx: SCDs NEURONTIN 300 MG PO TID, MOTRIN 600 MG PO Q 6 HRS PRN X1,.  MAG SULFATE 4G IV X1, KCL 20 MEQ IV X2,  OTHER SCHED MEDS ARE SAME 
                     
                     
                     
  
Version: [employer]® 2019 Rochelle Dang  © 2019 [employer] and/or one of its [place]. All Rights Reserved. CPT only © 2018 American Medical Association. All Rights Reserved.  
   
LOC:Acute Adult-Extended Stay (10/31/2020) by Deysi Pratt  
 
   
Review Status  Review Entered In Primary  11/3/2020 12:09   
   
Criteria Review REVIEW SUMMARY 
  
Patient: Kadeem Marx Review Number: [id number] Review Status: In Primary 
  
Condition Specific: Yes 
  
Condition Level Of Care Code: ACUTE Condition Level Of Care Description: Acute 
  
  
OUTCOMES Outcome Type: Primary 
  
  
  
REVIEW DETAILS 
  
Service Date: 10/31/2020 Admit Date: 10/28/2020 Product: Akron Delfin Adult Subset: Extended Stay (Symptom or finding within 24h) 
  (Excludes PO medications unless noted) Select Level of Care, One: 
            [ ] ACUTE, >= One: 
                [ ] Electrolyte or mineral imbalance, One: 
                    [X] Non-responder, not clinically stable for discharge and does not meet partial responder criteria (refer for secondary review) 
                    ~--Admin, IQ Admin Admin on 11- 12:09 PM--~ Hospitalist Progress Note Chief complaint-foot lower extremity burning pain, poor appetite and poor oral intake 
                      
                    Oral intake improving some since addition of Remeron, Carafate to PPI. Probiotic. Severe malnutrition and poor oral intake with depressed affect. Peripheral sensory neuropathy both mental status and neuropathy very likely nutritional component if not causative. Continue vitamin mineral calorie and protein/macronutrient support. Refusing PT and OT claims he would refuse home health. Potassium corrected magnesium still low confirming suspected total body depletion-former alcoholic quit 1 month ago. Denies urge to drink alcohol. EXAM:  Constitutional:      Appearance: He is ill-appearing. He is not diaphoretic. Comments: cachectic   HENT: Mouth: Mucous membranes are dry. Pharynx: No oropharyngeal exudate. Comments: Poor dentition. Musculoskeletal:    Significant erythema distal lower extremities-has improved now mostly erythema distal toes with good capillary refill. Skin: apillary Refill: Capillary refill takes 2 to 3 seconds. Findings: No bruising. Comments: Dermal sloughing lower extremities. Psychiatric:     Comments: More alert, more cooperative and outlook/affect appears improved today T   98.6, /54, P 95, R 17, 02 SAT 98% RA 
                    , CREAT 0.60, MAG 1.5,  
                     
                    A/P:   
                    Profound hypokalemia-corrected. Associated hypomagnesemia-as suspected recurrent serum hypomagnesemia suggesting total body depletion. --IV 4 g magnesium sulfate continue oral therapy. Potassium now greater than 4. Discontinue intravenous potassium and IV fluids. 
                                          bib on admit--resolved 
                      
 Failure to thrive, weakness-refusing physical therapy, I discussed home with home health and home PT and he claims he would not agree because he lives with his son and son's girlfriend and they have many dogs and they would bite any visitors and he is not ready to have a lawsuit. 
                      
                    Anorexia, failure to thrive Possible occult depression-former alcohol abuse quit 1 month ago. Tolerating current medications but complaining about oral meds. Titrate Remeron-appetite still poor. Improved from admission 
                      
                    Peripheral sensory neuropathy-definitely nutritional component-B12 level pending. Lower extremity dermal findings improved with nutritional supplementation/vitamin/mineral supplementation 
                      
                    Very poor dentition-will need outpatient dental referral remained stable. Continue Peridex 
                      
                    Depressed affect-denies suicidal thoughts or ideations-continue Remeron for appetite stimulation and most definitely malnutrition is contributing to current mental status. 
                      
                    Comorbidities as above include COPD, hepatitis C, alcohol abuse-none in 1 month, hypertension, failure to thrive, weight loss/cachexia 
                      
                    Possible discharge next few days if eating 
                      
                    DC planning/Dispo: Home with home health DVT ppx: SCDs 
                      
                    Code status: DNR Medical decision maker: Self, family contacts on file                     PSYCH CONSULT,  SYMBICORT INH BID,  FLORANEX 2 TAB PO BID, MAG  MG PO BID, REMERON 15MG PO Q HS, STRESS TAB 1 TAB PO QD, PROTONIX 40 MG PO QD,  CARAFATE 1G PO AC & HS, THIAMINE HCL  100 MG PO QD, SPIRIVA RESPIMAT 2.5 MCG INH QD,  MAG SULFATE 4G IV X1,. LOVENOX 40 MG SC QD, 
                     
                     
                     
  
Version: [employer]® 2019 [facility]  © 2019 [employer] and/or one of its [place]. All Rights Reserved. CPT only © 2018 American Medical Association.   All Rights Reserved.

## 2020-11-03 NOTE — PROGRESS NOTES
Problem: Self Care Deficits Care Plan (Adult) Goal: *Acute Goals and Plan of Care (Insert Text) Outcome: Progressing Towards Goal 
Note: 1. Patient will complete lower body bathing and dressing with CGA and adaptive equipment as needed. 2. Patient will complete toileting with CGA. 3. Patient will tolerate 30 minutes of OT treatment with 2-3 rest breaks to increase activity tolerance for ADLs. 4. Patient will complete functional transfers with CGA and adaptive equipment as needed. 5. Patient will complete functional mobility for household distances with CGA and appropriate safety awareness. 6. Patient will tolerate grooming tasks standing sink side with CGA for balance to improve standing tolerance for ADL. Timeframe: 7 visits OCCUPATIONAL THERAPY: Initial Assessment, Daily Note, and AM 11/3/2020 INPATIENT: OT Visit Days: 1 Payor: [facility] / [place] [facility] MEDIC[facility] HMO/PPO / Product Type: [employer] Care Medicare /  
  
NAME/AGE/GENDER: Jennifer Naranjo is a 61 y.o. male PRIMARY DIAGNOSIS:  Hypokalemia [E87.6] Hypokalemia Hypokalemia ICD-10: Treatment Diagnosis:  
 Generalized Muscle Weakness (M62.81) Other lack of cordination (R27.8) Repeated Falls (R29.6) Precautions/Allergies: 
   Bees [employer] allergenic extract] ASSESSMENT:  
 
Mr. Delicia Mei presents to the hospital with hypokalemia. Pt reports that prior to admission he was on the couch for three weeks and not eating. Pt is very thin. Pt in good spirits today and agreeable to working with therapy. Pt reports he is feeling better since admission. Pt c/o decreased sensation in B feet with burning/tingling that worsens with bearing weight through his feet. Pt did well with bed mobility and sat to the edge of the bed with SBA. Pt able to don slippers at the edge of the bed with CGA for dynamic sitting balance.  Pt limited with standing and taking steps requiring moderate assistance x 2 and reports hx of frequent falls at home due to feet being numb. Pt worked on sitting tolerance edge of bed for ADL tasks and participated in brushing his teeth and combing hair/beard with additional time. Tremor is present in the UEs with attempts to use it functionally for activity. Pt returned to supine at end of session. Pt is currently functioning below baseline for ADL/functional transfers and will benefit from OT services to address stated goals and plan of care. This section established at most recent assessment PROBLEM LIST (Impairments causing functional limitations): 
Decreased Strength Decreased ADL/Functional Activities Decreased Transfer Abilities Decreased Ambulation Ability/Technique Decreased Balance Increased Pain Decreased Activity Tolerance Increased Fatigue Decreased Flexibility/Joint Mobility Decreased [place] with Home Exercise Program 
 INTERVENTIONS PLANNED: (Benefits and precautions of occupational therapy have been discussed with the patient.) Activities of daily living training Adaptive equipment training Balance training Clothing management Neuromuscular re-eduation Therapeutic activity Therapeutic exercise TREATMENT PLAN: Frequency/Duration: Follow patient 3 times per week to address above goals. Rehabilitation Potential For Stated Goals: Good REHAB RECOMMENDATIONS (at time of discharge pending progress):   
Placement: It is my opinion, based on this patient's performance to date, that Mr. Radha Thompson may benefit from intensive therapy at a [address] [place] [address] after discharge due to the functional deficits listed above that are likely to improve with skilled rehabilitation and concerns that he/she may be unsafe to be unsupervised at home due to risk for falls and further functional decline . Equipment: TBD OCCUPATIONAL PROFILE AND HISTORY:  
History of Present Injury/Illness (Reason for Referral): 
 See H&P Past Medical History/Comorbidities:  
Mr. Eyal Daigle  has a past medical history of COPD (chronic obstructive pulmonary disease) ([facility] [address] Hepatitis C, Hepatitis C antibody test positive (10/12/2016), Hyperlipidemia, Hypertension, Influenza, Pulmonary nodule, and Tobacco abuse (6/24/2015). Mr. Eyal Daigle  has no past surgical history on file. Social History/Living Environment:  
Home Environment: Private residence One/Two Story Residence: One story Living Alone: No 
Support Systems: Child(walter) Patient Expects to be Discharged to[de-identified] Private residence Current DME Used/Available at Home: Walker, rolling Prior Level of Function/Work/Activity: 
Pt lives with son and has been on couch for 3 weeks. Pt has hx of frequent falls reporting on average 2/month. Pt typically completes ADL without assistance prior to decline and does not use assistive device for functional mobility. Personal Factors:   
      Social Background:  home situation difficulty with multiple dogs in the home including one that bites Past/Current Experience: pt on couch for past 3 weeks prior to admission Other factors that influence how disability is experienced by the patient:  multiple co-morbidities Number of Personal Factors/Comorbidities that affect the Plan of Care: Extensive review of physical, cognitive, and psychosocial performance (3+):  HIGH COMPLEXITY ASSESSMENT OF OCCUPATIONAL PERFORMANCE[de-identified]  
Activities of Daily Living:  
Basic ADLs (From Assessment) Complex ADLs (From Assessment) Feeding: Minimum assistance Oral Facial Hygiene/Grooming: Minimum assistance Bathing: Moderate assistance Upper Body Dressing: Minimum assistance Lower Body Dressing: Moderate assistance Toileting: Maximum assistance Instrumental ADL Meal Preparation: Total assistance Homemaking: Total assistance Grooming/Bathing/Dressing Activities of Daily Living Grooming Brushing Teeth: Stand-by assistance Brushing/Combing Hair: Stand-by assistance Cognitive Retraining Safety/Judgement: Fall prevention Lower Body Dressing Assistance Slip on Shoes with Back: Contact guard assistance Bed/Mat Mobility Supine to Sit: Stand-by assistance Sit to Supine: Stand-by assistance Sit to Stand: Moderate assistance;Assist x2 Stand to Sit: Moderate assistance;Assist x2 Scooting: Contact guard assistance Most Recent Physical Functioning:  
Gross Assessment: 
AROM: Generally decreased, functional 
Strength: Generally decreased, functional 
         
  
Posture: 
Posture (WDL): Exceptions to [facility] Posture Assessment: Forward head Balance: 
Sitting: Impaired Sitting - Static: Fair (occasional) Sitting - Dynamic: Prop sitting Standing: Impaired Standing - Static: Poor Standing - Dynamic : Poor Bed Mobility: 
Supine to Sit: Stand-by assistance Sit to Supine: Stand-by assistance Scooting: Contact guard assistance Wheelchair Mobility: 
  
Transfers: 
Sit to Stand: Moderate assistance;Assist x2 Stand to Sit: Moderate assistance;Assist x2 Patient Vitals for the past 6 hrs: 
 BP BP Patient Position SpO2 Pulse 11/03/20 1153 102/64 At rest 100 % 84 Mental Status Neurologic State: Alert Orientation Level: Oriented X4 Cognition: Follows commands Perception: Appears intact Perseveration: No perseveration noted Safety/Judgement: Fall prevention Physical Skills Involved: 
Range of Motion Balance Strength Activity Tolerance Sensation Fine Motor Control Gross Motor Control Pain (Chronic) Cognitive Skills Affected (resulting in the inability to perform in a timely and safe manner): Executive Function Psychosocial Skills Affected: 
Habits/Routines Self-Awareness Number of elements that affect the Plan of Care: 5+:  HIGH COMPLEXITY CLINICAL DECISION MAKING:  
[employer] AM-PAC 6 Clicks Daily Activity Inpatient Short Form How much help from another person does the patient currently need. .. Total A Lot A Little None 1. Putting on and taking off regular lower body clothing? [] 1   [x] 2   [] 3   [] 4  
2. Bathing (including washing, rinsing, drying)? [] 1   [x] 2   [] 3   [] 4  
3. Toileting, which includes using toilet, bedpan or urinal?   [] 1   [x] 2   [] 3   [] 4  
4. Putting on and taking off regular upper body clothing? [] 1   [] 2   [x] 3   [] 4  
5. Taking care of personal grooming such as brushing teeth? [] 1   [] 2   [x] 3   [] 4  
6. Eating meals? [] 1   [] 2   [x] 3   [] 4  
© 2007, Trustees of [address] [place] [address] [address] [address], under license to [employer]. All rights reserved Score:  Initial: 15 Most Recent: X (Date: -- ) Interpretation of Tool:  Represents activities that are increasingly more difficult (i.e. Bed mobility, Transfers, Gait). Medical Necessity:    
Patient demonstrates  
good and excellent 
 rehab potential due to higher previous functional level. Reason for Services/Other Comments: 
Patient continues to require skilled intervention due to Decreased independence with ADL/functional transfers Meng Esha Use of outcome tool(s) and clinical judgement create a POC that gives a: LOW COMPLEXITY  
 
 
 
TREATMENT:  
(In addition to Assessment/Re-Assessment sessions the following treatments were rendered) Pre-treatment Symptoms/Complaints:   
Pain: Initial:  
Pain Intensity 1: 5 Pain Location 1: Foot Pain Orientation 1: Left, Right  Post Session:  same Today's treatment session addressed Decreased Strength, Decreased ADL/Functional Activities, Decreased Transfer Abilities, Decreased Ambulation Ability/Technique, Decreased Balance, Increased Pain, Decreased Activity Tolerance, and Decreased Flexibility/Joint Mobility to progress towards achieving goal(s) stated above.  During this session,  Physical Therapy addressed  Ambulation to progress towards their discipline specific goal(s). Co-treatment was necessary to improve patient's ability to increase activity demands and ability to return to normal functional activity. Self Care: (23 minutes): Procedure(s) (per grid) utilized to improve and/or restore self-care/home management as related to dressing, grooming, and sitting tolerance/balance edge of bed in preparation for ADL . Required minimal visual, verbal, manual, and tactile cueing to facilitate activities of daily living skills and compensatory activities. Braces/Orthotics/Lines/Etc:  
O2 Device: Room air Treatment/Session Assessment:   
Response to Treatment:  Pt tolerated it c/o tingling/burning in B feet. Interdisciplinary Collaboration:  
Physical Therapist 
Occupational Therapist 
Registered Nurse After treatment position/precautions:  
Supine in bed Bed alarm/tab alert on Bed/Chair-wheels locked Call light within reach RN notified Compliance with Program/Exercises: Will assess as treatment progresses. Recommendations/Intent for next treatment session: \"Next visit will focus on advancements to more challenging activities and reduction in assistance provided\". Total Treatment Duration: OT Patient Time In/Time Out Time In: 1913 Time Out: 1059 Dianna Bryan OT

## 2020-11-03 NOTE — INTERDISCIPLINARY ROUNDS
Interdisciplinary team rounds were held 11/3/2020 with the following team members:Care Management, Nursing, Occupational Therapy and Physician and the patient and family. Plan of care discussed. See clinical pathway and/or care plan for interventions and desired outcomes. PT / OT recommended STR. CM following.

## 2020-11-03 NOTE — PROGRESS NOTES
CM provided patient with a list of STR facilities that are in network with his insurance. Patient stated he would let CM know once he has made choices. CM will follow up later today.

## 2020-11-03 NOTE — PROGRESS NOTES
CM spoke with patient regarding STR facilities. Patient provided CM with 3 choices of [facility], [address]. Referrals have been made. CM will await bed offers.

## 2020-11-04 NOTE — PROGRESS NOTES
Hourly rounds performed. All needs met. Bed is in low position and call light is within reach. Pt complained of pain in his feet and mouth stating \"I have a painful abscess in my mouth\" and he wants to  Know how its going to get treated. Pain managed per MAR. No other complaints at this time. Will continue to monitor and report to oncoming nurse.

## 2020-11-04 NOTE — INTERDISCIPLINARY ROUNDS
Interdisciplinary team rounds were held 11/4/2020 with the following team members:Care Management, Nursing, Occupational Therapy and Physician and the patient and family. Plan of care discussed. See clinical pathway and/or care plan for interventions and desired outcomes. Phosphorous 0.4 tpday and being replaced. Discharge planned for tomorrow pending lab values.

## 2020-11-04 NOTE — PROGRESS NOTES
Problem: Self Care Deficits Care Plan (Adult) Goal: *Acute Goals and Plan of Care (Insert Text) Outcome: Progressing Towards Goal 
Note: 1. Patient will complete lower body bathing and dressing with CGA and adaptive equipment as needed. 2. Patient will complete toileting with CGA. 3. Patient will tolerate 30 minutes of OT treatment with 2-3 rest breaks to increase activity tolerance for ADLs. 4. Patient will complete functional transfers with CGA and adaptive equipment as needed. 5. Patient will complete functional mobility for household distances with CGA and appropriate safety awareness. 6. Patient will tolerate grooming tasks standing sink side with CGA for balance to improve standing tolerance for ADL. Timeframe: 7 visits OCCUPATIONAL THERAPY: Daily Note and AM  
 11/4/2020 INPATIENT: OT Visit Days: 2 Payor: [facility] / [place] [facility] HMO/PPO / Product Type: Managed Care Medicare /  
  
NAME/AGE/GENDER: Ashanti Delgadillo is a 61 y.o. male PRIMARY DIAGNOSIS:  Hypokalemia [E87.6] Hypokalemia Hypokalemia ICD-10: Treatment Diagnosis:  
 · Generalized Muscle Weakness (M62.81) · Other lack of cordination (R27.8) · Repeated Falls (R29.6) Precautions/Allergies: 
   Bees [employer] allergenic extract] ASSESSMENT:  
 
Mr. Eyal Daigle presents to the hospital with hypokalemia. Pt reports that prior to admission he was on the couch for three weeks and not eating. 11/4/2020 Pt transferred out of bed with CGA. Good sitting balance while seated edge of bed. Pt completed standing with min a x's 2 for safety. Pt completed functional mobility with a rolling walker from the bed to the chair. Pt took a seated rest break and then completed ambulation in the room and in the hallway with CGA/min a and a chair follow for additional safety. Pt sat up in the recliner chair and left with belongings in reach. Good effort. Continue POC. This section established at most recent assessment PROBLEM LIST (Impairments causing functional limitations): 1. Decreased Strength 2. Decreased ADL/Functional Activities 3. Decreased Transfer Abilities 4. Decreased Ambulation Ability/Technique 5. Decreased Balance 6. Increased Pain 7. Decreased Activity Tolerance 8. Increased Fatigue 9. Decreased Flexibility/Joint Mobility 10. Decreased [place] with Home Exercise Program 
 INTERVENTIONS PLANNED: (Benefits and precautions of occupational therapy have been discussed with the patient.) 1. Activities of daily living training 2. Adaptive equipment training 3. Balance training 4. Clothing management 5. Neuromuscular re-eduation 6. Therapeutic activity 7. Therapeutic exercise TREATMENT PLAN: Frequency/Duration: Follow patient 3 times per week to address above goals. Rehabilitation Potential For Stated Goals: Good REHAB RECOMMENDATIONS (at time of discharge pending progress):   
Placement: It is my opinion, based on this patient's performance to date, that Mr. Verlee Blizzard may benefit from intensive therapy at a [address] [place] [address] after discharge due to the functional deficits listed above that are likely to improve with skilled rehabilitation and concerns that he/she may be unsafe to be unsupervised at home due to risk for falls and further functional decline . Equipment: ? TBD  
    
 
 
 
OCCUPATIONAL PROFILE AND HISTORY:  
History of Present Injury/Illness (Reason for Referral): 
See H&P Past Medical History/Comorbidities:  
Mr. Verlee Blizzard  has a past medical history of COPD (chronic obstructive pulmonary disease) ([facility] [address] Hepatitis C, Hepatitis C antibody test positive (10/12/2016), Hyperlipidemia, Hypertension, Influenza, Pulmonary nodule, and Tobacco abuse (6/24/2015). Mr. Verlee Blizzard  has no past surgical history on file. Social History/Living Environment:  
Home Environment: Private residence One/Two Story Residence: One story Living Alone: No 
Support Systems: Child(walter) Patient Expects to be Discharged to[de-identified] Private residence Current DME Used/Available at Home: Walker, rolling Prior Level of Function/Work/Activity: 
Pt lives with son and has been on couch for 3 weeks. Pt has hx of frequent falls reporting on average 2/month. Pt typically completes ADL without assistance prior to decline and does not use assistive device for functional mobility. Personal Factors:   
      Social Background:  home situation difficulty with multiple dogs in the home including one that bites Past/Current Experience: pt on couch for past 3 weeks prior to admission Other factors that influence how disability is experienced by the patient:  multiple co-morbidities Number of Personal Factors/Comorbidities that affect the Plan of Care: Extensive review of physical, cognitive, and psychosocial performance (3+):  HIGH COMPLEXITY ASSESSMENT OF OCCUPATIONAL PERFORMANCE[de-identified]  
Activities of Daily Living:  
Basic ADLs (From Assessment) Complex ADLs (From Assessment) Feeding: Minimum assistance Oral Facial Hygiene/Grooming: Minimum assistance Bathing: Moderate assistance Upper Body Dressing: Minimum assistance Lower Body Dressing: Moderate assistance Toileting: Maximum assistance Instrumental ADL Meal Preparation: Total assistance Homemaking: Total assistance Grooming/Bathing/Dressing Activities of Daily Living Bed/Mat Mobility Rolling: Stand-by assistance Supine to Sit: Stand-by assistance Sit to Stand: Minimum assistance Stand to Sit: Minimum assistance Scooting: Contact guard assistance Most Recent Physical Functioning:  
Gross Assessment: 
  
         
  
Posture: 
Posture (WDL): Exceptions to [facility] Posture Assessment: Forward head Balance: 
Sitting: Impaired Sitting - Static: Good (unsupported) Sitting - Dynamic: Fair (occasional) Standing: Impaired Standing - Static: Constant support;Poor Standing - Dynamic : Constant support;Poor Bed Mobility: 
Rolling: Stand-by assistance Supine to Sit: Stand-by assistance Scooting: Contact guard assistance Wheelchair Mobility: 
  
Transfers: 
Sit to Stand: Minimum assistance Stand to Sit: Minimum assistance Patient Vitals for the past 6 hrs: 
 BP BP Patient Position SpO2 Pulse 20 0728   96 %   
20 0740 (!) 90/54 At rest 96 % 97  
20 1100 117/69 At rest 99 % 88 Mental Status Neurologic State: Alert Orientation Level: Oriented X4 Cognition: Follows commands Perception: Appears intact Perseveration: No perseveration noted Safety/Judgement: Fall prevention Physical Skills Involved: 1. Range of Motion 2. Balance 3. Strength 4. Activity Tolerance 5. Sensation 6. Fine Motor Control 7. Gross Motor Control 8. Pain (Chronic) Cognitive Skills Affected (resulting in the inability to perform in a timely and safe manner): 1. Executive Function Psychosocial Skills Affected: 1. Habits/Routines 2. Self-Awareness Number of elements that affect the Plan of Care: 5+:  HIGH COMPLEXITY CLINICAL DECISION MAKIN[address] [place] [id number] AM-PAC 6 Clicks Daily Activity Inpatient Short Form How much help from another person does the patient currently need. .. Total A Lot A Little None 1. Putting on and taking off regular lower body clothing? [] 1   [x] 2   [] 3   [] 4  
2. Bathing (including washing, rinsing, drying)? [] 1   [x] 2   [] 3   [] 4  
3. Toileting, which includes using toilet, bedpan or urinal?   [] 1   [x] 2   [] 3   [] 4  
4. Putting on and taking off regular upper body clothing? [] 1   [] 2   [x] 3   [] 4  
5. Taking care of personal grooming such as brushing teeth? [] 1   [] 2   [x] 3   [] 4  
6. Eating meals? [] 1   [] 2   [x] 3   [] 4  
© , Trustees of [address] [place] [address] [address] [address], under license to [employer]. All rights reserved Score:  Initial: 15 Most Recent: X (Date: -- ) Interpretation of Tool:  Represents activities that are increasingly more difficult (i.e. Bed mobility, Transfers, Gait). Medical Necessity:    
· Patient demonstrates · good and excellent ·  rehab potential due to higher previous functional level. Reason for Services/Other Comments: 
· Patient continues to require skilled intervention due to · Decreased independence with ADL/functional transfers · . Use of outcome tool(s) and clinical judgement create a POC that gives a: LOW COMPLEXITY  
 
 
 
TREATMENT:  
(In addition to Assessment/Re-Assessment sessions the following treatments were rendered) Pre-treatment Symptoms/Complaints:   
Pain: Initial:  
Pain Intensity 1: 0  Post Session:  same Therapeutic Activity: (25 minutes): Therapeutic activities including Bed transfers, Chair transfers, Ambulation on level ground and static standing balance to improve mobility, strength, balance and coordination. Required minimal assist to promote static and dynamic balance in standing. Braces/Orthotics/Lines/Etc:  
· O2 Device: Room air Treatment/Session Assessment:   
· Response to Treatment:  Good effort · Interdisciplinary Collaboration:  
o Certified Occupational Therapy Assistant 
o Registered Nurse 
o Rehabilitation Attendant · After treatment position/precautions:  
o Up in chair 
o Bed alarm/tab alert on 
o Call light within reach 
o RN notified · Compliance with Program/Exercises: Will assess as treatment progresses. · Recommendations/Intent for next treatment session: \"Next visit will focus on advancements to more challenging activities and reduction in assistance provided\". Total Treatment Duration: OT Patient Time In/Time Out Time In: 7929 Time Out: 1015 Sandro Will

## 2020-11-04 NOTE — PROGRESS NOTES
CM contacted liaison with [facility] and [address] [place] [address] to follow up with referrals that were sent. Liaison stated patient will need to work with therapy or insurance will not approve. CM informed liaison patient has been working with therapy after we had a discussion with the importance. Liaison stated she would look at the referral again.

## 2020-11-04 NOTE — PROGRESS NOTES
Hourly rounds completed, pt denies needs at this time. Pt doing well today, walked the jaffe with PT. Taking all meds as asked. Pt had a drop in Phos and was repleated by IV. Please recheck a Phos and K+ level 2hrs after last bolus.

## 2020-11-04 NOTE — PROGRESS NOTES
Comprehensive Nutrition Assessment Type and Reason for Visit: [facility] Nutrition Recommendations/Plan:  
? Change to mechanical soft d/t dentition, allow foods from regular texture if patient requests. ? Continue Ensure Enlive TID. ? Continue Magic Cup BID. Malnutrition Assessment: 
Malnutrition Status: Severe malnutrition Context: Acute illness Findings of clinical characteristics of malnutrition:  
Energy Intake:  7 - 50% or less of est energy requirements for 5 or more days Weight Loss:  (potentially with 11% wt loss, unable to evaluate w only estimated wt available) Body Fat Loss:  7 - Moderate body fat loss, Fat overlying ribs, Orbital, Triceps Muscle Mass Loss:  7 - Moderate muscle mass loss, Clavicles (pectoralis & deltoids), Scapula (trapezius), Hand (interosseous), Thigh (quadraceps), Calf, Temples (temporalis) Fluid Accumulation:  No significant fluid accumulation,   
 Strength:  Not performed Nutrition Assesment: PMH of COPD, HCV, HLD, tobacco abuse, ETOH abuse. PAst workup for wt loss included CT chest abdomen pelvis 6/5/19 without obvious malignancy, stable pulmonary nodules, gastric wall thickening concerning for gastritis he refused follow-up EGD and colonoscopy. Admitted with hypokalemia, FTT, poor oral intake, wt loss, cachexia. Pt reports he stopped eating ~ 3 weeks ago. H&P indicates he also stopped drinking ~12 beers daily at this time. He indicates in the past he consumed ensure until it became too expensive. He reports drinking water, juice and coffee only over the past 3 weeks. PO intake is improving with admission. Lab indices have been reflective of refeeding syndrome with IVF and increasing oral intake. Nutrition Related Findings:     
Pt continues to complain of difficulties chewing which he associates with \"an abscess\" while pointing at his cheek bone.   Today he is agreeable to try mechanical soft texture. He continues to consume primarily oral supplements with recall of intake meeting ~80% needs. He readily accepts additional ensure at any of my visits. Abdominal Status (last documented): Intact abdomen with Active  bowel sounds. Last BM 11/03/20. Pertinent Labs: K 4, Phos 0.4, Mg 1.7 Pertinent Medications: peridex, floranex, mg ox, remeron, MVI, protonix, sodium phosphate replacement active, thiamine Current Nutrition Therapies: DIET REGULAR 
DIET NUTRITIONAL SUPPLEMENTS All Meals; Ensure Enlive ( ) DIET NUTRITIONAL SUPPLEMENTS Lunch, Dinner; Ly ( ) Current Intake: Average Meal Intake: 1-25% Average Supplement Intake: % Anthropometric Measures: 
· Height: 6' (182.9 cm) · Current Body Wt: 45.4 kg (100 lb 1.4 oz), Weight source: (Estimated) · Admission Body Wt: 100 lb 1.4 oz · Usual Body Wt: 50.8 kg (112 lb)(per EMR review wt from 1/15/2020), Percent weight change: -10.6 · Ideal Body Wt: 178 lbs (81 lbs), 56.2 % · BMI: 13.6, Underweight (BMI less than 18.5) Estimated Daily Nutrient Needs: 
Energy (kcal): 4564-3185(66-31 kcal/kg) (Kcal/kg, Weight Used: Admission) Protein (g): 55-68(1.2-1.5 g/kg) Weight Used: ( ) Fluid (ml/day): 2142-8459 (1 ml/kcal) Nutrition Diagnosis: · Severe malnutrition, In context of acute illness or injury related to (refusing to eat) as evidenced by severe loss of subcutaneous fat, severe muscle loss(po recall <15% estimated needs) · Inadequate oral intake related to biting/chewing (masticatory) difficulty(anorexia) as evidenced by (limited intake of reg text, majority intake from ONS) Nutrition Interventions:  
Food and/or Nutrient Delivery: Modify current diet, Continue oral nutrition supplement Coordination of Nutrition Care: Continue to monitor while inpatient, Plan of Care Discussed with Dr. Ariel Su Goals: Previous Goal Met: Goal(s) achieved Maintain PO >75% estimated needs Nutrition Monitoring and Evaluation:  
Behavioral-Environmental Outcomes: Readiness for change Food/Nutrient Intake Outcomes: Food and nutrient intake, Supplement intake Physical Signs/Symptoms Outcomes: Biochemical data, Chewing or swallowing Discharge Planning:   
Continue oral nutrition supplement Chino Lainez RD, LDN on 11/4/2020 at 2:15 PM 
Contact: [phone]

## 2020-11-04 NOTE — PROGRESS NOTES
Hospitalist Progress Note Patient: Bonnie Castillo MRN: [id number]  SSN: [id number] YOB: 1960  Age: 61 y.o. Sex: male Admit Date: 10/27/2020 LOS: 7 days Subjective:  
 
61year old CM with a PMH of COPD, HCV, HLD, tobacco abuse, and alcohol abuse admitted due to a month of progressive weakness and decreased intake for three weeks, who was found to have profound hypokalemia and hypomagnesemia. He also has peripheral neuropathy. He was living with his son, who apparently refuses to take him back so difficult dispo. 11/4 - He feels better today. Still with extreme neuropathy, but improving. Still with mouth \"ulcer\" being sore. Eating better. Doesn't feel as depressed. Denies CP/SOB. Denies F/C/N/V. Review of systems negative except stated above. Objective:  
 
Visit Vitals BP (!) 90/54 (BP 1 Location: Left arm, BP Patient Position: At rest) Pulse 97 Temp 98.1 °F (36.7 °C) Resp 18 Ht 6' (1.829 m) Wt 45.4 kg (100 lb) SpO2 96% BMI 13.56 kg/m² Oxygen Therapy O2 Sat (%): 96 % (11/04/20 0740) Pulse via Oximetry: 78 beats per minute (11/04/20 0728) O2 Device: Room air (11/04/20 0740) Intake and Output:  
 
Intake/Output Summary (Last 24 hours) at 11/4/2020 0840 Last data filed at 11/4/2020 3036 Gross per 24 hour Intake 600 ml Output 250 ml Net 350 ml Physical Exam:  
GENERAL: alert, cooperative, no distress, appears stated age EYE: conjunctivae/corneas clear. PERRL. THROAT & NECK: normal and no erythema or exudates noted. LUNG: diminished, no wheezing HEART: regular rate and rhythm, S1S2, no murmur, no JVD ABDOMEN: soft, non-tender, non-distended. Bowel sounds normal.  
EXTREMITIES:  No edema, 2+ pedal/radial pulses bilaterally SKIN: no rash or abnormalities NEUROLOGIC: A&Ox3. Cranial nerves 2-12 grossly intact. Lab/Data Review: 
Recent Results (from the past 24 hour(s)) MAGNESIUM  Collection Time: 11/03/20 12:00 PM  
 Result Value Ref Range Magnesium 1.7 (L) 1.8 - 2.4 mg/dL PHOSPHORUS Collection Time: 11/04/20  6:24 AM  
Result Value Ref Range Phosphorus 0.4 (LL) 2.3 - 3.7 MG/DL  
METABOLIC PANEL, BASIC Collection Time: 11/04/20  6:24 AM  
Result Value Ref Range Sodium 138 136 - 145 mmol/L Potassium 4.0 3.5 - 5.1 mmol/L Chloride 106 98 - 107 mmol/L  
 CO2 29 21 - 32 mmol/L Anion gap 3 (L) 7 - 16 mmol/L Glucose 91 65 - 100 mg/dL BUN 9 8 - 23 MG/DL Creatinine 0.53 (L) 0.8 - 1.5 MG/DL  
 GFR est AA >60 >60 ml/min/1.73m2 GFR est non-AA >60 >60 ml/min/1.73m2 Calcium 8.5 8.3 - 10.4 MG/DL  
 
 
SARS-CoV-2 Lab Results \"Novel Coronavirus\" Test: No results found for: COV2NT \"Emergent Disease\" Test: No results found for: EDPR \"SARS-COV-2\" Test: No results found for: XGCOVT \"Precision Labs\" Test: No results found for: RSLT Rapid Test: No results found for: COVR Imaging: Xr Chest [facility] Result Date: 10/27/2020 EXAM: Chest x-ray. INDICATION: Cough and dyspnea. COMPARISON: Prior CT chest on July 1, 2019. TECHNIQUE: Frontal view chest x-ray. FINDINGS: The lungs are hyperexpanded, consistent with underlying emphysema. No acute infiltrate or pulmonary edema is seen. The cardiac size, mediastinal contour and pulmonary vasculature are normal. No pneumothorax or pleural effusion is seen. IMPRESSION: 1. No acute process. 2. Emphysema. No results found for this visit on 10/27/20. Cultures: All Micro Results None Assessment/Plan:  
 
Principal Problem: Hypokalemia (10/28/2020) - Resolved - Due to decreased PO intake + ETOH abuse - Continue MVI 
- Check BMP daily Active Problems: Hypomagnesemia (10/31/2020) - Resolved - Due to decreased PO intake + ETOH abuse - Continue MVI 
- Continue Mag-Ox BID 
- Check Mag daily Hypophosphatemia (11/4/2020) - Phosphorus 0.4 
- Replace IV 
- Recheck this afternoon MARIBELL (acute kidney injury) ([facility] (10/31/2020) - Due to dehydration - Resolved Hypertension (4/9/2015) - Stable Peripheral neuropathy (10/31/2020) - Due to ETOH abuse - Continue Gabapentin Normocytic anemia (11/3/2020) - Stable COPD (chronic obstructive pulmonary disease) ([facility] (4/9/2015) - No acute issues - Continue aerosols Weight loss (1/15/2020) - Due to ETOH abuse and depression 
- Continue Mirtazapine Cachexia ([facility] (10/28/2020) - Due to COPD, ETOH abuse and depression 
- Continue Mirtazapine Depression (11/3/2020) - Continue Mirtazapine Chronic hepatitis C virus infection ([facility] () Severe protein-calorie malnutrition nila Chambershomero: less than 60% of standard weight) ([facility] (10/28/2020) - Continue supplements Alcohol abuse (11/3/2020) Today's Plan: Replace phosphorus. PT/OT. Continue current plan. DIET REGULAR 
DIET NUTRITIONAL SUPPLEMENTS All Meals; Ensure Enlive ( ) DIET NUTRITIONAL SUPPLEMENTS Lunch, Dinner; Ly ( ) DVT Prophylaxis: Lovenox Discharge Plan: To STR when approved Signed By: Soledad Parrish DO November 4, 2020

## 2020-11-05 NOTE — PROGRESS NOTES
Care Management Interventions PCP Verified by CM: Yes(Dr. Maynor Davey) Mode of Transport at Discharge: Other (see comment)(To be determined basedon needs) Transition of Care Consult (CM Consult): Discharge Planning Discharge Durable Medical Equipment: No(has a walker at home but does not use) Physical Therapy Consult: Yes Occupational Therapy Consult: Yes Speech Therapy Consult: No 
Current Support Network: Family Lives [place], Lives with Caregiver, Other(Lives with his son Jamie Alfonso) Confirm Follow Up Transport: Family The Patient and/or Patient Representative was Provided with a Choice of Provider and Agrees with the Discharge Plan?: Yes Name of the Patient Representative Who was Provided with a Choice of Provider and Agrees with the Discharge Plan: yu Alfonso Freedom of Choice List was Provided with Basic Dialogue that Supports the Patient's Individualized Plan of Care/Goals, Treatment Preferences and Shares the Quality Data Associated with the Providers?: Yes [employer] Information Provided?: No 
Discharge Location Discharge Placement: Skilled nursing facility Patient to discharge to [facility] today to room 111. Patient will transport via "[employer] at 1300. Patient notified and is in agreement with discharge plan. All milestones for discharge have been met. CM provided RN with number to call report for continuation of care.

## 2020-11-05 NOTE — DISCHARGE SUMMARY
Hospitalist Discharge Summary Patient ID: Robert Meeks [id number] 
2 y.o. 
1960 Admit date: 10/27/2020 Discharge date: 11/5/2020 Attending: El Nicholson DO 
PCP:  Lisa Rodrigues MD 
Treatment Team: Attending Provider: El Nicholson DO; Utilization Review: Leonor Mayen; Care Manager: Carol Frey RN; Consulting Provider: Cary Osborne MD; Charge Nurse: Ortiz Velez RN 
 
Principal Diagnosis Hypokalemia Hospital Problems as of 11/5/2020 Date Reviewed: 7/24/2017 Codes Class Noted - Resolved POA * (Principal) Hypokalemia ICD-10-CM: E87.6 ICD-9-CM: 276.8  10/28/2020 - Present Yes Hypomagnesemia ICD-10-CM: R55.47 
ICD-9-CM: 275.2  10/31/2020 - Present Yes MARIBELL (acute kidney injury) ([facility] ICD-10-CM: N17.9 ICD-9-CM: 584.9  10/31/2020 - Present Yes Normocytic anemia ICD-10-CM: D64.9 ICD-9-CM: 285.9  11/3/2020 - Present Yes Peripheral neuropathy ICD-10-CM: G62.9 ICD-9-CM: 356.9  10/31/2020 - Present Yes Hypertension ICD-10-CM: I10 
ICD-9-CM: 401.9  4/9/2015 - Present Yes Depression ICD-10-CM: F32.9 ICD-9-CM: 405  11/3/2020 - Present Yes Cachexia ([facility] ICD-10-CM: R64 
ICD-9-CM: 799.4  10/28/2020 - Present Yes Weight loss ICD-10-CM: R63.4 ICD-9-CM: 783.21  1/15/2020 - Present Yes COPD (chronic obstructive pulmonary disease) (HCC) ICD-10-CM: J44.9 ICD-9-CM: 785  4/9/2015 - Present Yes Alcohol abuse ICD-10-CM: F10.10 ICD-9-CM: 305.00  11/3/2020 - Present Yes Severe protein-calorie malnutrition ([facility] ICD-10-CM: X72 ICD-9-CM: 941  10/28/2020 - Present Severe protein-calorie malnutrition Mardel Anjelica: less than 60% of standard weight) ([facility] ICD-10-CM: X07 ICD-9-CM: 939  10/28/2020 - Present Yes Chronic hepatitis C virus infection ([facility] ICD-10-CM: B18.2 ICD-9-CM: 070.54  Unknown - Present Yes Hospital Course: Please refer to the admission H&P for details of presentation. In summary, the patient is a 61year old CM with a PMH of COPD, HCV, HLD, tobacco abuse, and alcohol abuse admitted due to a month of progressive weakness and decreased intake for three weeks, who was found to have profound hypokalemia, hypophosphatemia and hypomagnesemia. He also has peripheral neuropathy so his Gabapentin was increased. His electrolytes were replaced and improved. He was started on Remeron for depression and decreased appetite. He started eating more. He worked with PT/OT, who recommended SNF. He complained of a mouth ulcer and his teeth had lots of redness. He was started on Peridex. He remained stable and as discharged to SNF for further care. Significant Diagnostic Studies: 
 
Labs: Results:  
   
Chemistry Recent Labs 11/05/20 
5450 11/04/20 
2336 11/04/20 
2113  11/04/20 
0021 GLU 82  --   --   --  91   --   --   --  138  
K 3.1* 3.2* 3.2*   < > 4.0  
  --   --   --  106 CO2 25  --   --   --  29 BUN 11  --   --   --  9  
CREA 0.44*  --   --   --  0.53* CA 7.4*  --   --   --  8.5 AGAP 6*  --   --   --  3* ALB 1.8*  --   --   --   --   
 < > = values in this interval not displayed. CBC w/Diff No results for input(s): WBC, RBC, HGB, HCT, PLT, GRANS, LYMPH, EOS, HGBEXT, HCTEXT, PLTEXT in the last 72 hours. Cardiac Enzymes No results for input(s): CPK, CKND1, LALITHA in the last 72 hours. No lab exists for component: Denice  Coagulation No results for input(s): PTP, INR, APTT, INREXT in the last 72 hours. Lipid Panel Lab Results Component Value Date/Time Cholesterol, total 104 05/28/2019 08:49 AM  
 HDL Cholesterol 57 05/28/2019 08:49 AM  
 LDL, calculated 33 05/28/2019 08:49 AM  
 VLDL, calculated 14 05/28/2019 08:49 AM  
 Triglyceride 70 05/28/2019 08:49 AM  
  
BNP No results for input(s): BNPP in the last 72 hours. Liver Enzymes Recent Labs 11/05/20 
1784 ALB 1.8*  
  
 Thyroid Studies Lab Results Component Value Date/Time TSH 2.730 10/30/2020 01:30 AM  
    
 
 
Imaging: Xr Chest [facility] Result Date: 10/27/2020 IMPRESSION: 1. No acute process. 2. Emphysema. Microbiology/Cultures: All Micro Results None Discharge Exam: 
Visit Vitals /70 (BP 1 Location: Left arm, BP Patient Position: At rest) Pulse 90 Temp 97.4 °F (36.3 °C) Resp 18 Ht 6' (1.829 m) Wt 45.4 kg (100 lb) SpO2 99% BMI 13.56 kg/m² General appearance: alert, cooperative, no distress, appears stated age Lungs: clear to auscultation bilaterally Heart: regular rate and rhythm, S1, S2 normal, no murmur, click, rub or gallop Abdomen: soft, non-tender. Bowel sounds normal. No masses,  no organomegaly Extremities: no cyanosis or edema Neurologic: Grossly normal 
 
Disposition: SNF Discharge Condition: stable Patient Instructions:  
Current Discharge Medication List  
  
START taking these medications Details  
chlorhexidine (PERIDEX) 0.12 % solution Take 15 mL by mouth every twelve (12) hours for 14 days. Qty: 420 mL, Refills: 0  
  
magic mouthwash (SOHAIL) susp 10 mL by Swish and Spit route every six (6) hours. Qty: 1 Bottle, Refills: 0  
  
magnesium oxide (MAG-OX) 400 mg tablet Take 1 Tab by mouth two (2) times a day. Qty: 60 Tab, Refills: 0  
  
mirtazapine (REMERON) 15 mg tablet Take 1 Tab by mouth nightly. Qty: 30 Tab, Refills: 0  
  
multivitamin, stress formula (STRESS TAB) tablet Take 1 Tab by mouth daily for 10 days. Qty: 10 Tab, Refills: 0  
  
traMADoL (ULTRAM) 50 mg tablet Take 1 Tab by mouth every six (6) hours as needed for Pain for up to 3 days. Max Daily Amount: 200 mg. Qty: 12 Tab, Refills: 0 Associated Diagnoses: Peripheral polyneuropathy  
  
potassium chloride (K-DUR, KLOR-CON) 20 mEq tablet Take 2 Tabs by mouth daily. Qty: 30 Tab, Refills: 0 CONTINUE these medications which have CHANGED Details diazePAM (VALIUM) 2 mg tablet Take 1 Tab by mouth every twelve (12) hours as needed for Anxiety. Max Daily Amount: 4 mg. Qty: 6 Tab, Refills: 0 Associated Diagnoses: Anxiety  
  
gabapentin (NEURONTIN) 300 mg capsule Take 1 Cap by mouth three (3) times daily. Qty: 90 Cap, Refills: 0 CONTINUE these medications which have NOT CHANGED Details  
budesonide-formoteroL (SYMBICORT) 160-4.5 mcg/actuation HFAA Take 2 Puffs by inhalation two (2) times a day. Qty: 1 Inhaler, Refills: 11  
 Associated Diagnoses: Chronic obstructive pulmonary disease, unspecified COPD type ([address] omeprazole (PRILOSEC) 40 mg capsule Take 1 Cap by mouth daily. Qty: 30 Cap, Refills: 12  
 Associated Diagnoses: Weight loss  
  
umeclidinium (INCRUSE ELLIPTA) 62.5 mcg/actuation inhaler Take 1 Puff by inhalation daily. Qty: 1 Inhaler, Refills: 11  
 Associated Diagnoses: Chronic obstructive pulmonary disease, unspecified COPD type ([facility])  
  
albuterol (VENTOLIN HFA) 90 mcg/actuation inhaler Take 2 Puffs by inhalation every four (4) hours as needed for Wheezing. Qty: 1 Inhaler, Refills: 11  
 Associated Diagnoses: Chronic obstructive pulmonary disease, unspecified COPD type ([facility] Activity: Activity as tolerated Diet: Regular Diet Wound Care: As directed Follow-up ·   PCP in one week · Dentist/Periodontist once discharged from [facility] Time spent to discharge patient 35 minutes Signed: 
Erum Witt DO 
11/5/2020 
10:47 AM 
 
 .

## 2020-11-05 NOTE — PROGRESS NOTES
Nutrition Note Diet: DIET NUTRITIONAL SUPPLEMENTS All Meals; Ensure Enlive ( ) DIET NUTRITIONAL SUPPLEMENTS Lunch, Dinner; Ly ( ) DIET MECHANICAL SOFT Lab Results Component Value Date/Time Sodium 136 11/05/2020 05:53 AM  
 Potassium 3.1 (L) 11/05/2020 05:53 AM  
 Chloride 105 11/05/2020 05:53 AM  
 CO2 25 11/05/2020 05:53 AM  
 Anion gap 6 (L) 11/05/2020 05:53 AM  
 Glucose 82 11/05/2020 05:53 AM  
 BUN 11 11/05/2020 05:53 AM  
 Creatinine 0.44 (L) 11/05/2020 05:53 AM  
 Calcium 7.4 (L) 11/05/2020 05:53 AM  
 Albumin 1.8 (L) 11/05/2020 05:53 AM  
 Phosphorus 3.0 11/05/2020 05:53 AM  
Plan of Care Discussed with Dr, Sierra Nissen via [employer]. Vitamin and Mineral Supplement Therapy: 
Nutrition support orders for electrolyte management replacement activated on MAR. K and Mg replacement activated per protocol. Chino Orozco, PRINCE, LDN on 11/5/2020 at 8:57 AM 
Contact: [phone]

## 2020-11-25 NOTE — PROGRESS NOTES
Community Care Team documentation for patient in [facility] The information below provided by:Josie PT Update: Sup for bed mobiltiy, sup with transfers, Sup with  feet, Mod I with adls and toileting Nursing Update:wounds have resolved; m soft diet Discharge Date:receivied [address] dc date of 11/26; filing an appeal 
 
 
Assign to [facility]:

## 2020-12-03 NOTE — PROGRESS NOTES
Community Care Team documentation for patient in [facility]    The information below provided by:Josie    PT Update: discharged        Nursing Update:wounds have resolved; m soft diet      Discharge Date:11/27/20 W/ Interim and MOW      Assign to [id number] [facility]